# Patient Record
Sex: FEMALE | Race: ASIAN | NOT HISPANIC OR LATINO | URBAN - METROPOLITAN AREA
[De-identification: names, ages, dates, MRNs, and addresses within clinical notes are randomized per-mention and may not be internally consistent; named-entity substitution may affect disease eponyms.]

---

## 2018-07-05 ENCOUNTER — EMERGENCY (EMERGENCY)
Facility: HOSPITAL | Age: 33
LOS: 1 days | Discharge: HOME | End: 2018-07-05
Attending: EMERGENCY MEDICINE
Payer: MEDICAID

## 2018-07-05 VITALS
OXYGEN SATURATION: 100 % | SYSTOLIC BLOOD PRESSURE: 118 MMHG | RESPIRATION RATE: 16 BRPM | HEART RATE: 89 BPM | DIASTOLIC BLOOD PRESSURE: 81 MMHG | TEMPERATURE: 98 F

## 2018-07-05 VITALS
TEMPERATURE: 98 F | OXYGEN SATURATION: 100 % | HEART RATE: 85 BPM | SYSTOLIC BLOOD PRESSURE: 117 MMHG | DIASTOLIC BLOOD PRESSURE: 79 MMHG | RESPIRATION RATE: 18 BRPM

## 2018-07-05 PROCEDURE — 99053 MED SERV 10PM-8AM 24 HR FAC: CPT

## 2018-07-05 PROCEDURE — 99283 EMERGENCY DEPT VISIT LOW MDM: CPT | Mod: 25

## 2018-07-05 PROCEDURE — 99283 EMERGENCY DEPT VISIT LOW MDM: CPT

## 2018-07-05 RX ORDER — CALAMINE AND ZINC OXIDE AND PHENOL 160; 10 MG/ML; MG/ML
1 LOTION TOPICAL ONCE
Qty: 0 | Refills: 0 | Status: COMPLETED | OUTPATIENT
Start: 2018-07-05 | End: 2018-07-05

## 2018-07-05 RX ADMIN — CALAMINE AND ZINC OXIDE AND PHENOL 1 APPLICATION(S): 160; 10 LOTION TOPICAL at 01:27

## 2018-07-05 NOTE — ED PROVIDER NOTE - NS ED ROS FT
CONST: no fevers, no chills  EYES: no pain, no vision changes  ENT: no sore throat, no ear pain, no change in hearing  CV: no chest pain, no leg swelling  RESP: no shortness of breath, no cough  ABD: no abdominal pain, no nausea, no vomiting, no diarrhea  : no dysuria, no flank pain, no hematuria  MSK: no back pain, no extremity pain  NEURO: no headache or additional neurologic complaints  HEME: no easy bleeding  SKIN:  +rash on right arm

## 2018-07-05 NOTE — ED PROVIDER NOTE - PLAN OF CARE
1. TAKE ALL MEDICATIONS AS DIRECTED.    2. FOR PAIN OR FEVER YOU CAN TAKE IBUPROFEN (MOTRIN, ADVIL) OR ACETAMINOPHEN (TYLENOL) AS NEEDED, AS DIRECTED ON PACKAGING.  3. FOLLOW UP WITH YOUR PRIMARY DOCTOR WITHIN 5 DAYS AS DIRECTED.  4. IF YOU HAD LABS OR IMAGING DONE, YOU WERE GIVEN COPIES OF ALL LABS AND/OR IMAGING RESULTS FROM YOUR ER VISIT--PLEASE TAKE THEM WITH YOU TO YOUR FOLLOW UP APPOINTMENTS.  5. IF NEEDED, CALL PATIENT ACCESS SERVICES AT [phone] TO FIND A PRIMARY CARE PHYSICIAN.  OR CALL [phone] TO MAKE AN APPOINTMENT WITH THE CLINIC.  6. RETURN TO THE ER FOR ANY WORSENING SYMPTOMS OR CONCERNS.

## 2018-07-05 NOTE — ED PROVIDER NOTE - ATTENDING CONTRIBUTION TO CARE
34y/o F with no significant PMH c/o rash to the rigth forearm for 1 week, starting after exposure to poison ivy while gardening.  No fever/chills, redness has been improving but pain is persistent, had seen doctor at  and was prescribed a cream (unclear what) but her pharmacy did not have it in stock, now presents today for same symptoms.  Benadryl use at home with some improvement.    On exam, is well appearing in NAD, afebrile, MMM.  No drooling/stridor, no oral edema.  Skin:  +crusted raised lesion on dorsal aspect of forearm as well as two lesions on ventral aspect of mid-forearm; no surrounding erythema, no active bleeding/drainage.    PE and history are c/w dermatitis 2/2 poison ivy exposure, no evidence of bacterial superinfection.  Will dc with continued use of benadryl and addition of calamine lotion.  Return precautions for fever, increasing/streaking redness, purulent drainage.

## 2018-07-05 NOTE — ED PROVIDER NOTE - OBJECTIVE STATEMENT
33F no pmhx presents with one week of pruritic rash on her right arm which started 1 week ago after cleaning in her garden, went to urgent care, was told to take benadryl and was sent "a cream" to her pharmacy but she could not wait to pick it up. She has no other complaints. No rashes anywhere else.

## 2018-07-05 NOTE — ED PROVIDER NOTE - PHYSICAL EXAMINATION
Chantell Redmond, .:   GENERAL: Patient awake alert NAD.  HEENT: Moist mucous membranes, PERRL, EOMI  LUNGS: CTAB, no wheezes or crackles.   CARDIAC: RRR, no m/r/g.    ABDOMEN: Soft, NT, ND, No rebound, guarding. No CVA tenderness.   EXT: No edema. No calf tenderness.  NEURO: A&Ox3. Gait normal.    SKIN: Warm and dry. +crusted raised lesion on dorsal aspect of forearm as well as two lesions on ventral aspect of mid-forearm. No necrotic tissue present, no sign of cellulitis. no lesions anywhere else on the body.

## 2018-07-05 NOTE — ED PROVIDER NOTE - MEDICAL DECISION MAKING DETAILS
33F p/w rash. Likely poison ivy. Unlikely zoster as not dermatomal. Will send home with calamine lotions. Benadryl prn.

## 2018-07-05 NOTE — ED PROVIDER NOTE - CARE PLAN
Principal Discharge DX:	Poison ivy  Assessment and plan of treatment:	1. TAKE ALL MEDICATIONS AS DIRECTED.    2. FOR PAIN OR FEVER YOU CAN TAKE IBUPROFEN (MOTRIN, ADVIL) OR ACETAMINOPHEN (TYLENOL) AS NEEDED, AS DIRECTED ON PACKAGING.  3. FOLLOW UP WITH YOUR PRIMARY DOCTOR WITHIN 5 DAYS AS DIRECTED.  4. IF YOU HAD LABS OR IMAGING DONE, YOU WERE GIVEN COPIES OF ALL LABS AND/OR IMAGING RESULTS FROM YOUR ER VISIT--PLEASE TAKE THEM WITH YOU TO YOUR FOLLOW UP APPOINTMENTS.  5. IF NEEDED, CALL PATIENT ACCESS SERVICES AT [phone] TO FIND A PRIMARY CARE PHYSICIAN.  OR CALL [phone] TO MAKE AN APPOINTMENT WITH THE CLINIC.  6. RETURN TO THE ER FOR ANY WORSENING SYMPTOMS OR CONCERNS.

## 2018-07-05 NOTE — ED ADULT NURSE NOTE - OBJECTIVE STATEMENT
32 yo F no pmh came to ED c/o rash on right forearm starting 6 days ago after gardening.  Pt states that she went to an urgent care yesterday, was prescribed medication, but was unable to pick it up.  Pt states that she has used a cortisone cream that give her temporary relief.  Denies taking any other medications.  Denies any other symptoms, chest pain, back pain, SOB, fevers/chills, n/v/d, lightheadedness, dizziness, changes in urinary or bowel habits.  A&Ox4, skin w/d/i, rash noted on right forearm, blistered, scabbed over.  Safety and comfort maintained. Will continue to monitor.

## 2018-07-05 NOTE — ED ADULT NURSE NOTE - CHPI ED SYMPTOMS NEG
no fever/no scaly patches on skin/no petechia/no chills/no inflammation/no itching/no decreased eating/drinking/no bruising

## 2018-07-23 ENCOUNTER — EMERGENCY (EMERGENCY)
Facility: HOSPITAL | Age: 33
LOS: 1 days | Discharge: HOME | End: 2018-07-23
Attending: EMERGENCY MEDICINE
Payer: MEDICAID

## 2018-07-23 VITALS
OXYGEN SATURATION: 100 % | HEART RATE: 85 BPM | DIASTOLIC BLOOD PRESSURE: 75 MMHG | TEMPERATURE: 98 F | SYSTOLIC BLOOD PRESSURE: 128 MMHG | RESPIRATION RATE: 18 BRPM

## 2018-07-23 VITALS
RESPIRATION RATE: 20 BRPM | OXYGEN SATURATION: 100 % | TEMPERATURE: 98 F | HEIGHT: 62 IN | SYSTOLIC BLOOD PRESSURE: 147 MMHG | HEART RATE: 95 BPM | DIASTOLIC BLOOD PRESSURE: 95 MMHG | WEIGHT: 149.91 LBS

## 2018-07-23 LAB
ALBUMIN SERPL ELPH-MCNC: 4.2 G/DL — SIGNIFICANT CHANGE UP (ref 3.3–5)
ALP SERPL-CCNC: 109 U/L — SIGNIFICANT CHANGE UP (ref 40–120)
ALT FLD-CCNC: 9 U/L — LOW (ref 10–45)
ANION GAP SERPL CALC-SCNC: 12 MMOL/L — SIGNIFICANT CHANGE UP (ref 5–17)
APTT BLD: 28.8 SEC — SIGNIFICANT CHANGE UP (ref 27.5–37.4)
AST SERPL-CCNC: 16 U/L — SIGNIFICANT CHANGE UP (ref 10–40)
BASOPHILS # BLD AUTO: 0.1 K/UL — SIGNIFICANT CHANGE UP (ref 0–0.2)
BASOPHILS NFR BLD AUTO: 0.8 % — SIGNIFICANT CHANGE UP (ref 0–2)
BILIRUB SERPL-MCNC: 0.2 MG/DL — SIGNIFICANT CHANGE UP (ref 0.2–1.2)
BUN SERPL-MCNC: 11 MG/DL — SIGNIFICANT CHANGE UP (ref 7–23)
CALCIUM SERPL-MCNC: 9.6 MG/DL — SIGNIFICANT CHANGE UP (ref 8.4–10.5)
CHLORIDE SERPL-SCNC: 104 MMOL/L — SIGNIFICANT CHANGE UP (ref 96–108)
CO2 SERPL-SCNC: 24 MMOL/L — SIGNIFICANT CHANGE UP (ref 22–31)
CREAT SERPL-MCNC: 0.71 MG/DL — SIGNIFICANT CHANGE UP (ref 0.5–1.3)
EOSINOPHIL # BLD AUTO: 0.2 K/UL — SIGNIFICANT CHANGE UP (ref 0–0.5)
EOSINOPHIL NFR BLD AUTO: 2 % — SIGNIFICANT CHANGE UP (ref 0–6)
GLUCOSE SERPL-MCNC: 84 MG/DL — SIGNIFICANT CHANGE UP (ref 70–99)
HCT VFR BLD CALC: 38.1 % — SIGNIFICANT CHANGE UP (ref 34.5–45)
HGB BLD-MCNC: 13 G/DL — SIGNIFICANT CHANGE UP (ref 11.5–15.5)
INR BLD: 1.13 RATIO — SIGNIFICANT CHANGE UP (ref 0.88–1.16)
LYMPHOCYTES # BLD AUTO: 38.4 % — SIGNIFICANT CHANGE UP (ref 13–44)
LYMPHOCYTES # BLD AUTO: 4.6 K/UL — HIGH (ref 1–3.3)
MCHC RBC-ENTMCNC: 28.4 PG — SIGNIFICANT CHANGE UP (ref 27–34)
MCHC RBC-ENTMCNC: 34.2 GM/DL — SIGNIFICANT CHANGE UP (ref 32–36)
MCV RBC AUTO: 83 FL — SIGNIFICANT CHANGE UP (ref 80–100)
MONOCYTES # BLD AUTO: 0.9 K/UL — SIGNIFICANT CHANGE UP (ref 0–0.9)
MONOCYTES NFR BLD AUTO: 7.3 % — SIGNIFICANT CHANGE UP (ref 2–14)
NEUTROPHILS # BLD AUTO: 6.2 K/UL — SIGNIFICANT CHANGE UP (ref 1.8–7.4)
NEUTROPHILS NFR BLD AUTO: 51.5 % — SIGNIFICANT CHANGE UP (ref 43–77)
PLATELET # BLD AUTO: 329 K/UL — SIGNIFICANT CHANGE UP (ref 150–400)
POTASSIUM SERPL-MCNC: 3.8 MMOL/L — SIGNIFICANT CHANGE UP (ref 3.5–5.3)
POTASSIUM SERPL-SCNC: 3.8 MMOL/L — SIGNIFICANT CHANGE UP (ref 3.5–5.3)
PROT SERPL-MCNC: 8 G/DL — SIGNIFICANT CHANGE UP (ref 6–8.3)
PROTHROM AB SERPL-ACNC: 12.2 SEC — SIGNIFICANT CHANGE UP (ref 9.8–12.7)
RBC # BLD: 4.59 M/UL — SIGNIFICANT CHANGE UP (ref 3.8–5.2)
RBC # FLD: 12.7 % — SIGNIFICANT CHANGE UP (ref 10.3–14.5)
SODIUM SERPL-SCNC: 140 MMOL/L — SIGNIFICANT CHANGE UP (ref 135–145)
TROPONIN T, HIGH SENSITIVITY RESULT: <6 NG/L — SIGNIFICANT CHANGE UP (ref 0–51)
WBC # BLD: 12 K/UL — HIGH (ref 3.8–10.5)
WBC # FLD AUTO: 12 K/UL — HIGH (ref 3.8–10.5)

## 2018-07-23 PROCEDURE — 85730 THROMBOPLASTIN TIME PARTIAL: CPT

## 2018-07-23 PROCEDURE — 93010 ELECTROCARDIOGRAM REPORT: CPT

## 2018-07-23 PROCEDURE — 80053 COMPREHEN METABOLIC PANEL: CPT

## 2018-07-23 PROCEDURE — 99284 EMERGENCY DEPT VISIT MOD MDM: CPT | Mod: 25

## 2018-07-23 PROCEDURE — 85610 PROTHROMBIN TIME: CPT

## 2018-07-23 PROCEDURE — 85027 COMPLETE CBC AUTOMATED: CPT

## 2018-07-23 PROCEDURE — 84484 ASSAY OF TROPONIN QUANT: CPT

## 2018-07-23 PROCEDURE — 93005 ELECTROCARDIOGRAM TRACING: CPT

## 2018-07-23 RX ORDER — ASPIRIN/CALCIUM CARB/MAGNESIUM 324 MG
324 TABLET ORAL DAILY
Qty: 0 | Refills: 0 | Status: DISCONTINUED | OUTPATIENT
Start: 2018-07-23 | End: 2018-07-27

## 2018-07-23 RX ADMIN — Medication 324 MILLIGRAM(S): at 18:51

## 2018-07-23 NOTE — ED PROVIDER NOTE - PLAN OF CARE
Follow up with your Primary Care Physician within the next 2-3 days  Follow up with Cardiology within the next 2 days [phone]  Bring a copy of your test results with you to your appointment  Continue your current medication regimen  Return to the Emergency Room if you experience new or worsening symptoms

## 2018-07-23 NOTE — ED ADULT TRIAGE NOTE - CHIEF COMPLAINT QUOTE
Pt. states she has been having intermittent chest pain and shortness of breath for the past few month, resolves on its own. States three hours ago she began to have chest pain and shortness of breath. Pt. denies any cardiac history, pt. speaking in full sentences. Also reports dizziness.

## 2018-07-23 NOTE — ED PROVIDER NOTE - CARE PLAN
Principal Discharge DX:	Chest pain  Assessment and plan of treatment:	Follow up with your Primary Care Physician within the next 2-3 days  Follow up with Cardiology within the next 2 days [phone]  Bring a copy of your test results with you to your appointment  Continue your current medication regimen  Return to the Emergency Room if you experience new or worsening symptoms

## 2018-07-23 NOTE — ED PROVIDER NOTE - PHYSICAL EXAMINATION
+ left chest wall tenderness to palpation, + left pectorailis major pain with activation of the  muscle

## 2018-07-23 NOTE — ED PROVIDER NOTE - OBJECTIVE STATEMENT
33 F w  no PMHx presents w complaint of intermittent left sided chest tightness which radiates down the left arm to the fingers lasting 4 minutes. The pain has been present for the past 4 months. The pain returned today at 11 am but did not go away as it usually does. She also reports chest tightness and fatigue with todays episode. Denies recent travel. The pain is worse with movement of the left arm and deep inspiration.  + Fam Hx of CAD, her father had CABG at 59 1 week ago

## 2018-07-23 NOTE — ED PROVIDER NOTE - MEDICAL DECISION MAKING DETAILS
34 y/o female with left sided chest pain reproducible with palpation for the past 3 months. Father had history of CABG at 59 years old. DDx: ACS vs costochondritis. Will order cardiac workup and reassess.  ATTG: Dr. Jay

## 2018-07-23 NOTE — ED ADULT NURSE NOTE - OBJECTIVE STATEMENT
33 y.o female no significant pmh presenting to ED c/o sharp left sided chest discomfort that radiates down her left arm that began this am. pt states she has been experiencing similar s/s intermittently over the passed few months but usually the symptoms would resolve on their own and has never lasted this long or been this painful. pt states she also feels sob at times with CORONEL. cp and sob made better at rest and worse upon movement and with positional changes. pt also c/o pain upon inspiration, denies any cough, fevers, chills, weakness, numbness, tingling, or weakness. pt denies having cardiac hx but states she does have a family cardiac hx with her father. EKG completed upon arrival. labs and line obtained, remains on continuous cardiac monitor. family at the bedside. safety maintained.

## 2022-03-18 ENCOUNTER — EMERGENCY (EMERGENCY)
Facility: HOSPITAL | Age: 37
LOS: 1 days | Discharge: HOME | End: 2022-03-18
Attending: EMERGENCY MEDICINE | Admitting: STUDENT IN AN ORGANIZED HEALTH CARE EDUCATION/TRAINING PROGRAM
Payer: MEDICAID

## 2022-03-18 VITALS
TEMPERATURE: 98 F | OXYGEN SATURATION: 99 % | DIASTOLIC BLOOD PRESSURE: 82 MMHG | SYSTOLIC BLOOD PRESSURE: 126 MMHG | HEART RATE: 75 BPM | RESPIRATION RATE: 16 BRPM

## 2022-03-18 VITALS
SYSTOLIC BLOOD PRESSURE: 122 MMHG | HEIGHT: 62 IN | TEMPERATURE: 98 F | RESPIRATION RATE: 16 BRPM | HEART RATE: 98 BPM | DIASTOLIC BLOOD PRESSURE: 69 MMHG | OXYGEN SATURATION: 100 %

## 2022-03-18 LAB
ALBUMIN SERPL ELPH-MCNC: 4 G/DL — SIGNIFICANT CHANGE UP (ref 3.3–5)
ALP SERPL-CCNC: 81 U/L — SIGNIFICANT CHANGE UP (ref 40–120)
ALT FLD-CCNC: 9 U/L — SIGNIFICANT CHANGE UP (ref 4–33)
ANION GAP SERPL CALC-SCNC: 13 MMOL/L — SIGNIFICANT CHANGE UP (ref 7–14)
APPEARANCE UR: ABNORMAL
AST SERPL-CCNC: 13 U/L — SIGNIFICANT CHANGE UP (ref 4–32)
BASOPHILS # BLD AUTO: 0.07 K/UL — SIGNIFICANT CHANGE UP (ref 0–0.2)
BASOPHILS NFR BLD AUTO: 0.5 % — SIGNIFICANT CHANGE UP (ref 0–2)
BILIRUB SERPL-MCNC: <0.2 MG/DL — SIGNIFICANT CHANGE UP (ref 0.2–1.2)
BILIRUB UR-MCNC: NEGATIVE — SIGNIFICANT CHANGE UP
BLD GP AB SCN SERPL QL: NEGATIVE — SIGNIFICANT CHANGE UP
BUN SERPL-MCNC: 8 MG/DL — SIGNIFICANT CHANGE UP (ref 7–23)
CALCIUM SERPL-MCNC: 10.2 MG/DL — SIGNIFICANT CHANGE UP (ref 8.4–10.5)
CHLORIDE SERPL-SCNC: 103 MMOL/L — SIGNIFICANT CHANGE UP (ref 98–107)
CO2 SERPL-SCNC: 23 MMOL/L — SIGNIFICANT CHANGE UP (ref 22–31)
COLOR SPEC: YELLOW — SIGNIFICANT CHANGE UP
CREAT SERPL-MCNC: 0.52 MG/DL — SIGNIFICANT CHANGE UP (ref 0.5–1.3)
DIFF PNL FLD: ABNORMAL
EGFR: 122 ML/MIN/1.73M2 — SIGNIFICANT CHANGE UP
EOSINOPHIL # BLD AUTO: 0.21 K/UL — SIGNIFICANT CHANGE UP (ref 0–0.5)
EOSINOPHIL NFR BLD AUTO: 1.6 % — SIGNIFICANT CHANGE UP (ref 0–6)
GLUCOSE SERPL-MCNC: 86 MG/DL — SIGNIFICANT CHANGE UP (ref 70–99)
GLUCOSE UR QL: NEGATIVE — SIGNIFICANT CHANGE UP
HCG SERPL-ACNC: SIGNIFICANT CHANGE UP MIU/ML
HCT VFR BLD CALC: 30.8 % — LOW (ref 34.5–45)
HGB BLD-MCNC: 9.7 G/DL — LOW (ref 11.5–15.5)
IANC: 8.06 K/UL — SIGNIFICANT CHANGE UP (ref 1.5–8.5)
IMM GRANULOCYTES NFR BLD AUTO: 0.3 % — SIGNIFICANT CHANGE UP (ref 0–1.5)
KETONES UR-MCNC: NEGATIVE — SIGNIFICANT CHANGE UP
LEUKOCYTE ESTERASE UR-ACNC: NEGATIVE — SIGNIFICANT CHANGE UP
LYMPHOCYTES # BLD AUTO: 26.6 % — SIGNIFICANT CHANGE UP (ref 13–44)
LYMPHOCYTES # BLD AUTO: 3.41 K/UL — HIGH (ref 1–3.3)
MCHC RBC-ENTMCNC: 22 PG — LOW (ref 27–34)
MCHC RBC-ENTMCNC: 31.5 GM/DL — LOW (ref 32–36)
MCV RBC AUTO: 70 FL — LOW (ref 80–100)
MONOCYTES # BLD AUTO: 1.04 K/UL — HIGH (ref 0–0.9)
MONOCYTES NFR BLD AUTO: 8.1 % — SIGNIFICANT CHANGE UP (ref 2–14)
NEUTROPHILS # BLD AUTO: 8.06 K/UL — HIGH (ref 1.8–7.4)
NEUTROPHILS NFR BLD AUTO: 62.9 % — SIGNIFICANT CHANGE UP (ref 43–77)
NITRITE UR-MCNC: NEGATIVE — SIGNIFICANT CHANGE UP
NRBC # BLD: 0 /100 WBCS — SIGNIFICANT CHANGE UP
NRBC # FLD: 0 K/UL — SIGNIFICANT CHANGE UP
PH UR: 7 — SIGNIFICANT CHANGE UP (ref 5–8)
PLATELET # BLD AUTO: 491 K/UL — HIGH (ref 150–400)
POTASSIUM SERPL-MCNC: 4 MMOL/L — SIGNIFICANT CHANGE UP (ref 3.5–5.3)
POTASSIUM SERPL-SCNC: 4 MMOL/L — SIGNIFICANT CHANGE UP (ref 3.5–5.3)
PROT SERPL-MCNC: 7.5 G/DL — SIGNIFICANT CHANGE UP (ref 6–8.3)
PROT UR-MCNC: ABNORMAL
RBC # BLD: 4.4 M/UL — SIGNIFICANT CHANGE UP (ref 3.8–5.2)
RBC # FLD: 19 % — HIGH (ref 10.3–14.5)
RH IG SCN BLD-IMP: NEGATIVE — SIGNIFICANT CHANGE UP
SODIUM SERPL-SCNC: 139 MMOL/L — SIGNIFICANT CHANGE UP (ref 135–145)
SP GR SPEC: 1.02 — SIGNIFICANT CHANGE UP (ref 1–1.05)
UROBILINOGEN FLD QL: SIGNIFICANT CHANGE UP
WBC # BLD: 12.83 K/UL — HIGH (ref 3.8–10.5)
WBC # FLD AUTO: 12.83 K/UL — HIGH (ref 3.8–10.5)

## 2022-03-18 PROCEDURE — 76817 TRANSVAGINAL US OBSTETRIC: CPT | Mod: 26

## 2022-03-18 PROCEDURE — 99285 EMERGENCY DEPT VISIT HI MDM: CPT

## 2022-03-18 RX ORDER — SODIUM CHLORIDE 9 MG/ML
1000 INJECTION INTRAMUSCULAR; INTRAVENOUS; SUBCUTANEOUS ONCE
Refills: 0 | Status: COMPLETED | OUTPATIENT
Start: 2022-03-18 | End: 2022-03-18

## 2022-03-18 RX ADMIN — SODIUM CHLORIDE 2000 MILLILITER(S): 9 INJECTION INTRAMUSCULAR; INTRAVENOUS; SUBCUTANEOUS at 16:58

## 2022-03-18 NOTE — ED PROVIDER NOTE - PHYSICAL EXAMINATION
GEN:   comfortable, in no apparent distress, AOx3  EYES:   PERRL, extra-occular movements intact  RESP:   clear to auscultation bilaterally, non-labored, speaking in full sentences  ABD:   soft, non tender, no guarding  :   no cva tenderness  MSK:   no musculoskeletal tenderness, 5/5 strength, moving all extremities  SKIN:   dry, intact, no rash  NEURO:   AOx3, no focal weakness or loss of sensation, gait normal, GCS 15  PSYCH: calm, cooperative, no apparent risk to self and others

## 2022-03-18 NOTE — ED ADULT NURSE NOTE - OBJECTIVE STATEMENT
Pt awake and alert x 4 co vaginal bleeding earlier today passing a large clot. Pt now denies any cramps or pain. iv placed pt awaiting u/s.  at bedside.

## 2022-03-18 NOTE — ED PROVIDER NOTE - PROGRESS NOTE DETAILS
Patient noted to be RH negative on results.  Did not receive Rhogam during this pregnancy, will order dose. Patient received Rhogam. Patient nontoxic and medically stable for discharge. Results  discussed with patient. Return precautions provided and patient understands to return to the ED for concerning or worsening signs and symptoms. Instructed to follow up with OBGYN and agreeable. Patient's questions answered.

## 2022-03-18 NOTE — ED PROVIDER NOTE - OBJECTIVE STATEMENT
36 year old female no past history, 8 weeks pregnant  LMP 22 presents for vaginal bleeding.  States that bleeding started with one clot at 1030 this morning and since has been going through about 1 pad every 2 hours.  Patient denies any cramping, fevers, chills.  Denies any pregnancy complications prior to today.  OBGYN Dr. Cotter affiliated with [facility], [place].

## 2022-03-18 NOTE — ED PROVIDER NOTE - CLINICAL SUMMARY MEDICAL DECISION MAKING FREE TEXT BOX
36 year old female no past history, 8 weeks pregnant  LMP 22 presents for vaginal bleeding 8 weeks pregnant.  Will check labs, US, provide IVF.

## 2022-03-18 NOTE — ED PROVIDER NOTE - SHIFT CHANGE DETAILS
Dr. Landeros: Pt signed out to me by Dr. Guerrero. Pt pending US reading. Hemodynamically stable at this time.

## 2022-03-18 NOTE — ED PROVIDER NOTE - ATTENDING CONTRIBUTION TO CARE
agree with NP note    "36 year old female no past history, 8 weeks pregnant  LMP 22 presents for vaginal bleeding.  States that bleeding started with one clot at 1030 this morning and since has been going through about 1 pad every 2 hours.  Patient denies any cramping, fevers, chills.  Denies any pregnancy complications prior to today.  OBGYN Dr. Cotter affiliated with [facility]"    PE: well appearing; VSS: CTAB/L; s1 s2 no m/r/g abd soft/NT/ND ext: no edema    Imp: r/o ectopic pregnancy; labs, type, UA, TVUS and reassess

## 2022-03-18 NOTE — ED PROVIDER NOTE - PATIENT PORTAL LINK FT
You can access the FollowMyHealth Patient Portal offered by [facility] by registering at the following website: [email or web address]. By joining [facility]’s FollowMyHealth portal, you will also be able to view your health information using other applications (apps) compatible with our system.

## 2022-03-18 NOTE — ED PROVIDER NOTE - NS ED ATTENDING STATEMENT MOD
This was a shared visit with the JANEE. I reviewed and verified the documentation and independently performed the documented:

## 2022-03-19 LAB
CULTURE RESULTS: SIGNIFICANT CHANGE UP
SPECIMEN SOURCE: SIGNIFICANT CHANGE UP

## 2022-03-21 PROBLEM — Z00.00 ENCOUNTER FOR PREVENTIVE HEALTH EXAMINATION: Status: ACTIVE | Noted: 2022-03-21

## 2022-03-23 ENCOUNTER — APPOINTMENT (OUTPATIENT)
Dept: OBGYN | Facility: CLINIC | Age: 37
End: 2022-03-23
Payer: MEDICAID

## 2022-03-23 VITALS
BODY MASS INDEX: 28.91 KG/M2 | DIASTOLIC BLOOD PRESSURE: 77 MMHG | HEIGHT: 62 IN | WEIGHT: 157.13 LBS | OXYGEN SATURATION: 98 % | SYSTOLIC BLOOD PRESSURE: 122 MMHG | TEMPERATURE: 98.2 F

## 2022-03-23 DIAGNOSIS — Z32.01 ENCOUNTER FOR PREGNANCY TEST, RESULT POSITIVE: ICD-10-CM

## 2022-03-23 DIAGNOSIS — Z82.49 FAMILY HISTORY OF ISCHEMIC HEART DISEASE AND OTHER DISEASES OF THE CIRCULATORY SYSTEM: ICD-10-CM

## 2022-03-23 DIAGNOSIS — Z86.69 PERSONAL HISTORY OF OTHER DISEASES OF THE NERVOUS SYSTEM AND SENSE ORGANS: ICD-10-CM

## 2022-03-23 DIAGNOSIS — Z78.9 OTHER SPECIFIED HEALTH STATUS: ICD-10-CM

## 2022-03-23 DIAGNOSIS — Z83.3 FAMILY HISTORY OF DIABETES MELLITUS: ICD-10-CM

## 2022-03-23 PROCEDURE — 99204 OFFICE O/P NEW MOD 45 MIN: CPT | Mod: 25

## 2022-03-23 PROCEDURE — 76815 OB US LIMITED FETUS(S): CPT | Mod: 59

## 2022-03-23 RX ORDER — PRENATAL VIT NO.130/IRON/FOLIC 27MG-0.8MG
27-0.8 TABLET ORAL
Refills: 0 | Status: ACTIVE | COMMUNITY

## 2022-03-23 NOTE — HISTORY OF PRESENT ILLNESS
[Normal Amount/Duration] :  normal amount and duration [Regular Cycle Intervals] : periods have been regular [Menarche Age: ____] : age at menarche was [unfilled] [FreeTextEntry1] : 01/17/2022 [No] : Patient does not have concerns regarding sex [Currently Active] : currently active [Men] : men [Vaginal] : vaginal [Yes] : Yes

## 2022-03-23 NOTE — PHYSICAL EXAM
[Chaperone Present] : A chaperone was present in the examining room during all aspects of the physical examination [Appropriately responsive] : appropriately responsive [Alert] : alert [No Acute Distress] : no acute distress [No Lymphadenopathy] : no lymphadenopathy [Regular Rate Rhythm] : regular rate rhythm [No Murmurs] : no murmurs [Clear to Auscultation B/L] : clear to auscultation bilaterally [Soft] : soft [Non-tender] : non-tender [Non-distended] : non-distended [No HSM] : No HSM [No Lesions] : no lesions [No Mass] : no mass [Oriented x3] : oriented x3 [Examination Of The Breasts] : a normal appearance [No Masses] : no breast masses were palpable [Labia Majora] : normal [Labia Minora] : normal [Discharge] : discharge [Scant] : scant [Foul Smelling] : not foul smelling [White] : white [Thick] : thick [Bloody] : bloody [Normal] : normal [Normal Position] : in a normal position [Enlarged ___ wks] : enlarged [unfilled] ~Uweeks [Uterine Adnexae] : normal

## 2022-03-24 LAB
HBV SURFACE AG SER QL: NONREACTIVE
HIV1+2 AB SPEC QL IA.RAPID: NONREACTIVE
RUBV IGG FLD-ACNC: 12.5 INDEX
RUBV IGG SER-IMP: POSITIVE
T PALLIDUM AB SER QL IA: NEGATIVE

## 2022-03-25 LAB
BACTERIA UR CULT: NORMAL
C TRACH RRNA SPEC QL NAA+PROBE: NOT DETECTED
HPV HIGH+LOW RISK DNA PNL CVX: NOT DETECTED
LEAD BLD-MCNC: <1 UG/DL
N GONORRHOEA RRNA SPEC QL NAA+PROBE: NOT DETECTED
SOURCE TP AMPLIFICATION: NORMAL

## 2022-03-26 LAB
APPEARANCE: CLEAR
BACTERIA: NEGATIVE
BILIRUBIN URINE: NEGATIVE
BLOOD URINE: ABNORMAL
COLOR: NORMAL
GLUCOSE QUALITATIVE U: NEGATIVE
HYALINE CASTS: 1 /LPF
KETONES URINE: NEGATIVE
LEUKOCYTE ESTERASE URINE: NEGATIVE
MICROSCOPIC-UA: NORMAL
NITRITE URINE: NEGATIVE
PH URINE: 6.5
PROTEIN URINE: NEGATIVE
RED BLOOD CELLS URINE: 4 /HPF
SPECIFIC GRAVITY URINE: 1.01
SQUAMOUS EPITHELIAL CELLS: 1 /HPF
UROBILINOGEN URINE: NORMAL
WHITE BLOOD CELLS URINE: 0 /HPF

## 2022-03-28 ENCOUNTER — EMERGENCY (EMERGENCY)
Facility: HOSPITAL | Age: 37
LOS: 1 days | Discharge: HOME | End: 2022-03-28
Attending: EMERGENCY MEDICINE
Payer: MEDICAID

## 2022-03-28 VITALS
HEART RATE: 86 BPM | SYSTOLIC BLOOD PRESSURE: 116 MMHG | RESPIRATION RATE: 18 BRPM | TEMPERATURE: 99 F | OXYGEN SATURATION: 100 % | DIASTOLIC BLOOD PRESSURE: 75 MMHG

## 2022-03-28 VITALS
HEART RATE: 107 BPM | SYSTOLIC BLOOD PRESSURE: 171 MMHG | RESPIRATION RATE: 18 BRPM | TEMPERATURE: 98 F | HEIGHT: 62 IN | OXYGEN SATURATION: 99 % | WEIGHT: 151.9 LBS | DIASTOLIC BLOOD PRESSURE: 101 MMHG

## 2022-03-28 DIAGNOSIS — Z98.890 OTHER SPECIFIED POSTPROCEDURAL STATES: Chronic | ICD-10-CM

## 2022-03-28 LAB
ALBUMIN SERPL ELPH-MCNC: 3.8 G/DL — SIGNIFICANT CHANGE UP (ref 3.3–5)
ALP SERPL-CCNC: 70 U/L — SIGNIFICANT CHANGE UP (ref 40–120)
ALT FLD-CCNC: 7 U/L — LOW (ref 10–45)
ANION GAP SERPL CALC-SCNC: 12 MMOL/L — SIGNIFICANT CHANGE UP (ref 5–17)
APPEARANCE UR: CLEAR — SIGNIFICANT CHANGE UP
AST SERPL-CCNC: 8 U/L — LOW (ref 10–40)
BASOPHILS # BLD AUTO: 0.06 K/UL — SIGNIFICANT CHANGE UP (ref 0–0.2)
BASOPHILS NFR BLD AUTO: 0.5 % — SIGNIFICANT CHANGE UP (ref 0–2)
BILIRUB SERPL-MCNC: 0.2 MG/DL — SIGNIFICANT CHANGE UP (ref 0.2–1.2)
BILIRUB UR-MCNC: NEGATIVE — SIGNIFICANT CHANGE UP
BLD GP AB SCN SERPL QL: POSITIVE — SIGNIFICANT CHANGE UP
BUN SERPL-MCNC: 6 MG/DL — LOW (ref 7–23)
CALCIUM SERPL-MCNC: 9.1 MG/DL — SIGNIFICANT CHANGE UP (ref 8.4–10.5)
CHLORIDE SERPL-SCNC: 103 MMOL/L — SIGNIFICANT CHANGE UP (ref 96–108)
CO2 SERPL-SCNC: 19 MMOL/L — LOW (ref 22–31)
COLOR SPEC: COLORLESS — SIGNIFICANT CHANGE UP
CREAT SERPL-MCNC: 0.51 MG/DL — SIGNIFICANT CHANGE UP (ref 0.5–1.3)
DIFF PNL FLD: NEGATIVE — SIGNIFICANT CHANGE UP
EGFR: 122 ML/MIN/1.73M2 — SIGNIFICANT CHANGE UP
EOSINOPHIL # BLD AUTO: 0.09 K/UL — SIGNIFICANT CHANGE UP (ref 0–0.5)
EOSINOPHIL NFR BLD AUTO: 0.7 % — SIGNIFICANT CHANGE UP (ref 0–6)
GLUCOSE SERPL-MCNC: 84 MG/DL — SIGNIFICANT CHANGE UP (ref 70–99)
GLUCOSE UR QL: NEGATIVE — SIGNIFICANT CHANGE UP
HCG SERPL-ACNC: HIGH MIU/ML
HCT VFR BLD CALC: 29.3 % — LOW (ref 34.5–45)
HGB BLD-MCNC: 8.8 G/DL — LOW (ref 11.5–15.5)
IMM GRANULOCYTES NFR BLD AUTO: 0.4 % — SIGNIFICANT CHANGE UP (ref 0–1.5)
KETONES UR-MCNC: NEGATIVE — SIGNIFICANT CHANGE UP
LEUKOCYTE ESTERASE UR-ACNC: NEGATIVE — SIGNIFICANT CHANGE UP
LYMPHOCYTES # BLD AUTO: 19.5 % — SIGNIFICANT CHANGE UP (ref 13–44)
LYMPHOCYTES # BLD AUTO: 2.54 K/UL — SIGNIFICANT CHANGE UP (ref 1–3.3)
MCHC RBC-ENTMCNC: 21.5 PG — LOW (ref 27–34)
MCHC RBC-ENTMCNC: 30 GM/DL — LOW (ref 32–36)
MCV RBC AUTO: 71.5 FL — LOW (ref 80–100)
MONOCYTES # BLD AUTO: 0.75 K/UL — SIGNIFICANT CHANGE UP (ref 0–0.9)
MONOCYTES NFR BLD AUTO: 5.8 % — SIGNIFICANT CHANGE UP (ref 2–14)
NEUTROPHILS # BLD AUTO: 9.54 K/UL — HIGH (ref 1.8–7.4)
NEUTROPHILS NFR BLD AUTO: 73.1 % — SIGNIFICANT CHANGE UP (ref 43–77)
NITRITE UR-MCNC: NEGATIVE — SIGNIFICANT CHANGE UP
NRBC # BLD: 0 /100 WBCS — SIGNIFICANT CHANGE UP (ref 0–0)
PH UR: 7 — SIGNIFICANT CHANGE UP (ref 5–8)
PLATELET # BLD AUTO: 477 K/UL — HIGH (ref 150–400)
POTASSIUM SERPL-MCNC: 4 MMOL/L — SIGNIFICANT CHANGE UP (ref 3.5–5.3)
POTASSIUM SERPL-SCNC: 4 MMOL/L — SIGNIFICANT CHANGE UP (ref 3.5–5.3)
PROT SERPL-MCNC: 6.9 G/DL — SIGNIFICANT CHANGE UP (ref 6–8.3)
PROT UR-MCNC: NEGATIVE — SIGNIFICANT CHANGE UP
RBC # BLD: 4.1 M/UL — SIGNIFICANT CHANGE UP (ref 3.8–5.2)
RBC # FLD: 19 % — HIGH (ref 10.3–14.5)
RH IG SCN BLD-IMP: NEGATIVE — SIGNIFICANT CHANGE UP
SODIUM SERPL-SCNC: 134 MMOL/L — LOW (ref 135–145)
SP GR SPEC: 1 — LOW (ref 1.01–1.02)
UROBILINOGEN FLD QL: NEGATIVE — SIGNIFICANT CHANGE UP
WBC # BLD: 13.03 K/UL — HIGH (ref 3.8–10.5)
WBC # FLD AUTO: 13.03 K/UL — HIGH (ref 3.8–10.5)

## 2022-03-28 PROCEDURE — 93975 VASCULAR STUDY: CPT | Mod: 26

## 2022-03-28 PROCEDURE — 80053 COMPREHEN METABOLIC PANEL: CPT

## 2022-03-28 PROCEDURE — 87086 URINE CULTURE/COLONY COUNT: CPT

## 2022-03-28 PROCEDURE — 76801 OB US < 14 WKS SINGLE FETUS: CPT | Mod: 26

## 2022-03-28 PROCEDURE — 76817 TRANSVAGINAL US OBSTETRIC: CPT

## 2022-03-28 PROCEDURE — 86870 RBC ANTIBODY IDENTIFICATION: CPT

## 2022-03-28 PROCEDURE — 99284 EMERGENCY DEPT VISIT MOD MDM: CPT | Mod: 25

## 2022-03-28 PROCEDURE — 76801 OB US < 14 WKS SINGLE FETUS: CPT

## 2022-03-28 PROCEDURE — 85025 COMPLETE CBC W/AUTO DIFF WBC: CPT

## 2022-03-28 PROCEDURE — 99285 EMERGENCY DEPT VISIT HI MDM: CPT

## 2022-03-28 PROCEDURE — 86901 BLOOD TYPING SEROLOGIC RH(D): CPT

## 2022-03-28 PROCEDURE — 81003 URINALYSIS AUTO W/O SCOPE: CPT

## 2022-03-28 PROCEDURE — 84702 CHORIONIC GONADOTROPIN TEST: CPT

## 2022-03-28 PROCEDURE — 93975 VASCULAR STUDY: CPT

## 2022-03-28 PROCEDURE — 86900 BLOOD TYPING SEROLOGIC ABO: CPT

## 2022-03-28 PROCEDURE — 86850 RBC ANTIBODY SCREEN: CPT

## 2022-03-28 PROCEDURE — 36415 COLL VENOUS BLD VENIPUNCTURE: CPT

## 2022-03-28 PROCEDURE — 76817 TRANSVAGINAL US OBSTETRIC: CPT | Mod: 26,59

## 2022-03-28 PROCEDURE — 86077 PHYS BLOOD BANK SERV XMATCH: CPT

## 2022-03-28 NOTE — ED PROVIDER NOTE - CARE PLAN
1 Principal Discharge DX:	Urinary retention   Principal Discharge DX:	Urinary retention  Secondary Diagnosis:	Subchorionic hemorrhage

## 2022-03-28 NOTE — ED PROVIDER NOTE - PATIENT PORTAL LINK FT
You can access the FollowMyHealth Patient Portal offered by [facility] by registering at the following website: [email or web address]. By joining [employer]’s FollowMyHealth portal, you will also be able to view your health information using other applications (apps) compatible with our system.

## 2022-03-28 NOTE — ED PROVIDER NOTE - ATTENDING CONTRIBUTION TO CARE
Patient is a 35 yo F with history of sciatica, , LMP 22, here for urinary retention and vaginal bleeding. Patient reports she last urinated around 10:30 AM and has the urge to urinate but cannot urinate at this time. She also reports she had vaginal bleeding in this pregnancy but did not bleed for 1 week until today. She is complaining of lower abdominal pain and back pain. No fevers, chills, vomiting.     Of note, patient had urinary retention last week and required a hidalgo. She has a confirmed IUP by ultrasound last week.    VS noted  Gen: uncomfortable  HEENT: EOMI, mmm  Lungs: CTAB/L no C/ W /R   CVS: RRR   Abd; Soft, ttp in lower abdomen  :   Ext: no edema  Skin: no rash  Neuro AAOx3 non focal clear speech  a/p: urinary retention/ lower abdominal discomfort, vaginal bleeding - concern for threatened AB, missed AB, bleeding in pregnancy, UTI. plan for labs, TVUS, hidalgo catheter, u/a and reassess.   - Jacy BANKS Patient is a 37 yo F with history of sciatica, , LMP 22, here for urinary retention and vaginal bleeding. Patient reports she last urinated around 10:30 AM and has the urge to urinate but cannot urinate at this time. She also reports she had vaginal bleeding in this pregnancy but did not bleed for 1 week until today. She is complaining of lower abdominal pain and back pain. No fevers, chills, vomiting.     Of note, patient had urinary retention last week and required a hidalgo. She has a confirmed IUP by ultrasound last week.    VS noted  Gen: uncomfortable  HEENT: EOMI, mmm  Lungs: CTAB/L no C/ W /R   CVS: RRR   Abd; Soft, ttp in lower abdomen  : done with Dr. Ba, normal external genitalia, os open to finger tip, closed internally, blood present at cervix, no adnexal tenderness  Ext: no edema  Skin: no rash  Neuro AAOx3 non focal clear speech  a/p: urinary retention/ lower abdominal discomfort, vaginal bleeding - concern for threatened AB, missed AB, bleeding in pregnancy, UTI. plan for labs, TVUS, hidalgo catheter, u/a and reassess.   Update: Hidalgo was placed - ~ 700 cc came out. Patient feels improved.   - Jacy BANKS

## 2022-03-28 NOTE — ED PROVIDER NOTE - OBJECTIVE STATEMENT
37yo F  @10 weeks here for vaginal bleeding and not being able to urinate. Pt last urinated this AM and since has not been able to. Pt also w/ vaginal bleeding today. Has had bleeding this pregnancy, was found to have subchorionic hematoma. Denies fever, cp, sob, abdominal pain, change in urine or bowel.

## 2022-03-28 NOTE — ED PROVIDER NOTE - CLINICAL SUMMARY MEDICAL DECISION MAKING FREE TEXT BOX
@10 weeks here for retention of urine and vaginal bleeding. VSS. Exam w/ lower abdominal tenderness. Concern for urinary retention + miscarriage vs subchorionic hematoma. Will obtain labs, urine, place hidalgo cath, TVUS, hcg, reassess.

## 2022-03-28 NOTE — ED ADULT NURSE NOTE - OBJECTIVE STATEMENT
Pt is 10 wks pregnant, with c/o urinary retention since 1030 this AM.  She was seen in ED last week for the same issue and had Tariq placed at that time.  She also noted that she started to bleed vaginally today, using 1 pad/hour.  States she also had vaginal bleeding last week with the urinary retention episode.  Pt is in obvious distress, Tariq placed, which immediately drained 400 cc clear yellow urine, and subsequently Tariq drained a total of 1000cc over next half hour. Pt had immediate relief once Tariq began to drain.

## 2022-03-28 NOTE — ED PROVIDER NOTE - PROGRESS NOTE DETAILS
CALDERON Ba (PGY-2) - pt w/ subchorionic hematoma and multiple large fibroids on US, single viable IUP. fibroids and preg are likely contributing to her retention. d/w pt she will follow up w/ urology and her GYN this week.

## 2022-03-28 NOTE — ED PROVIDER NOTE - NSFOLLOWUPINSTRUCTIONS_ED_ALL_ED_FT
You were seen in the Emergency Department for urinary retention and vaginal bleeding. As discussed you will follow up with urology in 7-10 days and see your GYN this week.     1) Continue all previously prescribed medications as directed.    2) Follow up with your primary care physician - take copies of your results.    3) Return to the Emergency Department for worsening or persistent symptoms, and/or ANY NEW OR CONCERNING SYMPTOMS.

## 2022-03-28 NOTE — ED PROVIDER NOTE - PHYSICAL EXAMINATION
Jacy MD:    VS noted  Gen: uncomfortable  HEENT: EOMI, mmm  Lungs: CTAB/L no C/ W /R   CVS: RRR   Abd; Soft, ttp in lower abdomen  : done with Dr. Ba, normal external genitalia, os open to finger tip, closed internally, blood present at cervix, no adnexal tenderness  Ext: no edema  Skin: no rash  Neuro AAOx3 non focal clear speech

## 2022-03-29 LAB
CULTURE RESULTS: NO GROWTH — SIGNIFICANT CHANGE UP
CYTOLOGY CVX/VAG DOC THIN PREP: NORMAL
HGB A MFR BLD: 98.3 %
HGB A2 MFR BLD: 1.7 %
HGB FRACT BLD-IMP: NORMAL
SPECIMEN SOURCE: SIGNIFICANT CHANGE UP

## 2022-03-30 NOTE — ED POST DISCHARGE NOTE - DETAILS
3/30/22: results d/w pt, pt had been given rhogam within 2 weeks, feels well at this time. advised followup with OBGYN. pt has appt this week. return precautions given. all questions answered - Yeison Mooney PA-C

## 2022-03-31 LAB — CFTR MUT TESTED BLD/T: NEGATIVE

## 2022-04-05 ENCOUNTER — APPOINTMENT (OUTPATIENT)
Dept: OBGYN | Facility: CLINIC | Age: 37
End: 2022-04-05

## 2022-04-12 ENCOUNTER — APPOINTMENT (OUTPATIENT)
Dept: ANTEPARTUM | Facility: CLINIC | Age: 37
End: 2022-04-12

## 2022-04-18 ENCOUNTER — APPOINTMENT (OUTPATIENT)
Dept: UROLOGY | Facility: HOSPITAL | Age: 37
End: 2022-04-18

## 2022-04-21 ENCOUNTER — APPOINTMENT (OUTPATIENT)
Dept: OBGYN | Facility: CLINIC | Age: 37
End: 2022-04-21

## 2022-06-14 NOTE — ED ADULT TRIAGE NOTE - NS ED TRIAGE AVPU SCALE
Last seen: 03/09/2022  Last refilled:   Disp Refills Start End    albuterol 108 (90 Base) MCG/ACT inhaler 18 g 1 5/10/2022     Sig - Route: INHALE 2 PUFFS INTO THE LUNGS EVERY 4 HOURS AS NEEDED FOR SHORTNESS OF BREATH OR WHEEZING - Inhalation      F/u appointment: 06/15/2022  
Alert-The patient is alert, awake and responds to voice. The patient is oriented to time, place, and person. The triage nurse is able to obtain subjective information.

## 2022-06-17 ENCOUNTER — INPATIENT (INPATIENT)
Facility: HOSPITAL | Age: 37
LOS: 3 days | Discharge: HOME | End: 2022-06-21
Attending: OBSTETRICS & GYNECOLOGY | Admitting: OBSTETRICS & GYNECOLOGY
Payer: MEDICAID

## 2022-06-17 ENCOUNTER — TRANSCRIPTION ENCOUNTER (OUTPATIENT)
Age: 37
End: 2022-06-17

## 2022-06-17 DIAGNOSIS — Z3A.00 WEEKS OF GESTATION OF PREGNANCY NOT SPECIFIED: ICD-10-CM

## 2022-06-17 DIAGNOSIS — O26.899 OTHER SPECIFIED PREGNANCY RELATED CONDITIONS, UNSPECIFIED TRIMESTER: ICD-10-CM

## 2022-06-17 DIAGNOSIS — Z98.890 OTHER SPECIFIED POSTPROCEDURAL STATES: Chronic | ICD-10-CM

## 2022-06-18 VITALS — OXYGEN SATURATION: 100 % | HEART RATE: 86 BPM

## 2022-06-18 LAB
ALBUMIN SERPL ELPH-MCNC: 2.3 G/DL — LOW (ref 3.3–5)
ALBUMIN SERPL ELPH-MCNC: 2.7 G/DL — LOW (ref 3.3–5)
ALBUMIN SERPL ELPH-MCNC: 3.2 G/DL — LOW (ref 3.3–5)
ALP SERPL-CCNC: 64 U/L — SIGNIFICANT CHANGE UP (ref 40–120)
ALP SERPL-CCNC: 65 U/L — SIGNIFICANT CHANGE UP (ref 40–120)
ALP SERPL-CCNC: 94 U/L — SIGNIFICANT CHANGE UP (ref 40–120)
ALT FLD-CCNC: 10 U/L — SIGNIFICANT CHANGE UP (ref 10–45)
ALT FLD-CCNC: 6 U/L — LOW (ref 10–45)
ALT FLD-CCNC: 9 U/L — LOW (ref 10–45)
ANION GAP SERPL CALC-SCNC: 11 MMOL/L — SIGNIFICANT CHANGE UP (ref 5–17)
ANION GAP SERPL CALC-SCNC: 13 MMOL/L — SIGNIFICANT CHANGE UP (ref 5–17)
ANION GAP SERPL CALC-SCNC: 7 MMOL/L — SIGNIFICANT CHANGE UP (ref 5–17)
ANISOCYTOSIS BLD QL: SIGNIFICANT CHANGE UP
APTT BLD: 25.3 SEC — LOW (ref 27.5–35.5)
APTT BLD: 25.5 SEC — LOW (ref 27.5–35.5)
APTT BLD: 26.2 SEC — LOW (ref 27.5–35.5)
AST SERPL-CCNC: 16 U/L — SIGNIFICANT CHANGE UP (ref 10–40)
AST SERPL-CCNC: 17 U/L — SIGNIFICANT CHANGE UP (ref 10–40)
AST SERPL-CCNC: 32 U/L — SIGNIFICANT CHANGE UP (ref 10–40)
BASE EXCESS BLDV CALC-SCNC: -4 MMOL/L — LOW (ref -2–2)
BASOPHILS # BLD AUTO: 0 K/UL — SIGNIFICANT CHANGE UP (ref 0–0.2)
BASOPHILS # BLD AUTO: 0.05 K/UL — SIGNIFICANT CHANGE UP (ref 0–0.2)
BASOPHILS NFR BLD AUTO: 0 % — SIGNIFICANT CHANGE UP (ref 0–2)
BASOPHILS NFR BLD AUTO: 0.3 % — SIGNIFICANT CHANGE UP (ref 0–2)
BILIRUB DIRECT SERPL-MCNC: <0.1 MG/DL — SIGNIFICANT CHANGE UP (ref 0–0.3)
BILIRUB INDIRECT FLD-MCNC: >0.4 MG/DL — SIGNIFICANT CHANGE UP (ref 0.2–1)
BILIRUB SERPL-MCNC: 0.3 MG/DL — SIGNIFICANT CHANGE UP (ref 0.2–1.2)
BILIRUB SERPL-MCNC: 0.5 MG/DL — SIGNIFICANT CHANGE UP (ref 0.2–1.2)
BILIRUB SERPL-MCNC: <0.1 MG/DL — LOW (ref 0.2–1.2)
BLD GP AB SCN SERPL QL: POSITIVE — SIGNIFICANT CHANGE UP
BUN SERPL-MCNC: 10 MG/DL — SIGNIFICANT CHANGE UP (ref 7–23)
BUN SERPL-MCNC: 5 MG/DL — LOW (ref 7–23)
BUN SERPL-MCNC: 7 MG/DL — SIGNIFICANT CHANGE UP (ref 7–23)
CA-I SERPL-SCNC: 1.21 MMOL/L — SIGNIFICANT CHANGE UP (ref 1.15–1.33)
CALCIUM SERPL-MCNC: 10.2 MG/DL — SIGNIFICANT CHANGE UP (ref 8.4–10.5)
CALCIUM SERPL-MCNC: 8.2 MG/DL — LOW (ref 8.4–10.5)
CALCIUM SERPL-MCNC: 8.7 MG/DL — SIGNIFICANT CHANGE UP (ref 8.4–10.5)
CHLORIDE BLDV-SCNC: 102 MMOL/L — SIGNIFICANT CHANGE UP (ref 96–108)
CHLORIDE SERPL-SCNC: 102 MMOL/L — SIGNIFICANT CHANGE UP (ref 96–108)
CHLORIDE SERPL-SCNC: 103 MMOL/L — SIGNIFICANT CHANGE UP (ref 96–108)
CHLORIDE SERPL-SCNC: 106 MMOL/L — SIGNIFICANT CHANGE UP (ref 96–108)
CO2 BLDV-SCNC: 22 MMOL/L — SIGNIFICANT CHANGE UP (ref 22–26)
CO2 SERPL-SCNC: 17 MMOL/L — LOW (ref 22–31)
CO2 SERPL-SCNC: 20 MMOL/L — LOW (ref 22–31)
CO2 SERPL-SCNC: 22 MMOL/L — SIGNIFICANT CHANGE UP (ref 22–31)
CREAT SERPL-MCNC: 0.41 MG/DL — LOW (ref 0.5–1.3)
CREAT SERPL-MCNC: 0.46 MG/DL — LOW (ref 0.5–1.3)
CREAT SERPL-MCNC: 0.55 MG/DL — SIGNIFICANT CHANGE UP (ref 0.5–1.3)
EGFR: 120 ML/MIN/1.73M2 — SIGNIFICANT CHANGE UP
EGFR: 125 ML/MIN/1.73M2 — SIGNIFICANT CHANGE UP
EGFR: 128 ML/MIN/1.73M2 — SIGNIFICANT CHANGE UP
EOSINOPHIL # BLD AUTO: 0.03 K/UL — SIGNIFICANT CHANGE UP (ref 0–0.5)
EOSINOPHIL # BLD AUTO: 0.13 K/UL — SIGNIFICANT CHANGE UP (ref 0–0.5)
EOSINOPHIL NFR BLD AUTO: 0.2 % — SIGNIFICANT CHANGE UP (ref 0–6)
EOSINOPHIL NFR BLD AUTO: 0.9 % — SIGNIFICANT CHANGE UP (ref 0–6)
FIBRINOGEN PPP-MCNC: 447 MG/DL — SIGNIFICANT CHANGE UP (ref 330–520)
FIBRINOGEN PPP-MCNC: 697 MG/DL — HIGH (ref 330–520)
GAS PNL BLDA: SIGNIFICANT CHANGE UP
GAS PNL BLDV: 131 MMOL/L — LOW (ref 136–145)
GAS PNL BLDV: SIGNIFICANT CHANGE UP
GAS PNL BLDV: SIGNIFICANT CHANGE UP
GLUCOSE BLDV-MCNC: 122 MG/DL — HIGH (ref 70–99)
GLUCOSE SERPL-MCNC: 100 MG/DL — HIGH (ref 70–99)
GLUCOSE SERPL-MCNC: 125 MG/DL — HIGH (ref 70–99)
GLUCOSE SERPL-MCNC: 182 MG/DL — HIGH (ref 70–99)
HCO3 BLDV-SCNC: 21 MMOL/L — LOW (ref 22–29)
HCT VFR BLD CALC: 21.6 % — LOW (ref 34.5–45)
HCT VFR BLD CALC: 24.2 % — LOW (ref 34.5–45)
HCT VFR BLD CALC: 24.5 % — LOW (ref 34.5–45)
HCT VFR BLD CALC: 28.7 % — LOW (ref 34.5–45)
HCT VFR BLDA CALC: 26 % — LOW (ref 34.5–46.5)
HGB BLD CALC-MCNC: 8.6 G/DL — LOW (ref 11.7–16.1)
HGB BLD-MCNC: 7.3 G/DL — LOW (ref 11.5–15.5)
HGB BLD-MCNC: 8.1 G/DL — LOW (ref 11.5–15.5)
HGB BLD-MCNC: 8.3 G/DL — LOW (ref 11.5–15.5)
HGB BLD-MCNC: 8.8 G/DL — LOW (ref 11.5–15.5)
HOROWITZ INDEX BLDV+IHG-RTO: 21 — SIGNIFICANT CHANGE UP
IMM GRANULOCYTES NFR BLD AUTO: 0.8 % — SIGNIFICANT CHANGE UP (ref 0–1.5)
INR BLD: 1.14 RATIO — SIGNIFICANT CHANGE UP (ref 0.88–1.16)
INR BLD: 1.2 RATIO — HIGH (ref 0.88–1.16)
INR BLD: 1.21 RATIO — HIGH (ref 0.88–1.16)
LACTATE BLDV-MCNC: 1.5 MMOL/L — SIGNIFICANT CHANGE UP (ref 0.7–2)
LYMPHOCYTES # BLD AUTO: 1.43 K/UL — SIGNIFICANT CHANGE UP (ref 1–3.3)
LYMPHOCYTES # BLD AUTO: 15.2 % — SIGNIFICANT CHANGE UP (ref 13–44)
LYMPHOCYTES # BLD AUTO: 2.86 K/UL — SIGNIFICANT CHANGE UP (ref 1–3.3)
LYMPHOCYTES # BLD AUTO: 9.6 % — LOW (ref 13–44)
MAGNESIUM SERPL-MCNC: 1.3 MG/DL — LOW (ref 1.6–2.6)
MAGNESIUM SERPL-MCNC: 1.7 MG/DL — SIGNIFICANT CHANGE UP (ref 1.6–2.6)
MANUAL SMEAR VERIFICATION: SIGNIFICANT CHANGE UP
MCHC RBC-ENTMCNC: 22.7 PG — LOW (ref 27–34)
MCHC RBC-ENTMCNC: 27.1 PG — SIGNIFICANT CHANGE UP (ref 27–34)
MCHC RBC-ENTMCNC: 27.1 PG — SIGNIFICANT CHANGE UP (ref 27–34)
MCHC RBC-ENTMCNC: 27.6 PG — SIGNIFICANT CHANGE UP (ref 27–34)
MCHC RBC-ENTMCNC: 30.7 GM/DL — LOW (ref 32–36)
MCHC RBC-ENTMCNC: 33.1 GM/DL — SIGNIFICANT CHANGE UP (ref 32–36)
MCHC RBC-ENTMCNC: 33.8 GM/DL — SIGNIFICANT CHANGE UP (ref 32–36)
MCHC RBC-ENTMCNC: 34.3 GM/DL — SIGNIFICANT CHANGE UP (ref 32–36)
MCV RBC AUTO: 74.2 FL — LOW (ref 80–100)
MCV RBC AUTO: 80.3 FL — SIGNIFICANT CHANGE UP (ref 80–100)
MCV RBC AUTO: 80.4 FL — SIGNIFICANT CHANGE UP (ref 80–100)
MCV RBC AUTO: 81.9 FL — SIGNIFICANT CHANGE UP (ref 80–100)
MICROCYTES BLD QL: SIGNIFICANT CHANGE UP
MONOCYTES # BLD AUTO: 0.77 K/UL — SIGNIFICANT CHANGE UP (ref 0–0.9)
MONOCYTES # BLD AUTO: 1.39 K/UL — HIGH (ref 0–0.9)
MONOCYTES NFR BLD AUTO: 5.2 % — SIGNIFICANT CHANGE UP (ref 2–14)
MONOCYTES NFR BLD AUTO: 7.4 % — SIGNIFICANT CHANGE UP (ref 2–14)
NEUTROPHILS # BLD AUTO: 12.53 K/UL — HIGH (ref 1.8–7.4)
NEUTROPHILS # BLD AUTO: 14.38 K/UL — HIGH (ref 1.8–7.4)
NEUTROPHILS NFR BLD AUTO: 76.1 % — SIGNIFICANT CHANGE UP (ref 43–77)
NEUTROPHILS NFR BLD AUTO: 84.3 % — HIGH (ref 43–77)
NRBC # BLD: 0 /100 WBCS — SIGNIFICANT CHANGE UP (ref 0–0)
PCO2 BLDV: 35 MMHG — LOW (ref 39–42)
PH BLDV: 7.38 — SIGNIFICANT CHANGE UP (ref 7.32–7.43)
PHOSPHATE SERPL-MCNC: 3.9 MG/DL — SIGNIFICANT CHANGE UP (ref 2.5–4.5)
PHOSPHATE SERPL-MCNC: 4.1 MG/DL — SIGNIFICANT CHANGE UP (ref 2.5–4.5)
PLAT MORPH BLD: NORMAL — SIGNIFICANT CHANGE UP
PLATELET # BLD AUTO: 277 K/UL — SIGNIFICANT CHANGE UP (ref 150–400)
PLATELET # BLD AUTO: 309 K/UL — SIGNIFICANT CHANGE UP (ref 150–400)
PLATELET # BLD AUTO: 322 K/UL — SIGNIFICANT CHANGE UP (ref 150–400)
PLATELET # BLD AUTO: 496 K/UL — HIGH (ref 150–400)
PLATELET MAPPING ACTF MAX AMPLITUDE: 16.5 MM — SIGNIFICANT CHANGE UP (ref 2–19)
PLATELET MAPPING ADP MAXIMUM AMPLITUDE: 65.4 MM — SIGNIFICANT CHANGE UP (ref 45–69)
PLATELET MAPPING ADP PERCENT INHIBITION: 8.6 % — SIGNIFICANT CHANGE UP (ref 0–17)
PLATELET MAPPING HKH MAXIMUM AMPLITUDE: 70 MM (ref 53–68)
PO2 BLDV: 88 MMHG — HIGH (ref 25–45)
POIKILOCYTOSIS BLD QL AUTO: SLIGHT — SIGNIFICANT CHANGE UP
POTASSIUM BLDV-SCNC: 4.3 MMOL/L — SIGNIFICANT CHANGE UP (ref 3.5–5.1)
POTASSIUM SERPL-MCNC: 3.8 MMOL/L — SIGNIFICANT CHANGE UP (ref 3.5–5.3)
POTASSIUM SERPL-MCNC: 3.9 MMOL/L — SIGNIFICANT CHANGE UP (ref 3.5–5.3)
POTASSIUM SERPL-MCNC: 4.4 MMOL/L — SIGNIFICANT CHANGE UP (ref 3.5–5.3)
POTASSIUM SERPL-SCNC: 3.8 MMOL/L — SIGNIFICANT CHANGE UP (ref 3.5–5.3)
POTASSIUM SERPL-SCNC: 3.9 MMOL/L — SIGNIFICANT CHANGE UP (ref 3.5–5.3)
POTASSIUM SERPL-SCNC: 4.4 MMOL/L — SIGNIFICANT CHANGE UP (ref 3.5–5.3)
PROT SERPL-MCNC: 4.6 G/DL — LOW (ref 6–8.3)
PROT SERPL-MCNC: 5.1 G/DL — LOW (ref 6–8.3)
PROT SERPL-MCNC: 6.6 G/DL — SIGNIFICANT CHANGE UP (ref 6–8.3)
PROTHROM AB SERPL-ACNC: 13.3 SEC — SIGNIFICANT CHANGE UP (ref 10.5–13.4)
PROTHROM AB SERPL-ACNC: 13.8 SEC — HIGH (ref 10.5–13.4)
PROTHROM AB SERPL-ACNC: 14.1 SEC — HIGH (ref 10.5–13.4)
RAPIDTEG MAXIMUM AMPLITUDE: 68.1 MM — SIGNIFICANT CHANGE UP (ref 52–70)
RBC # BLD: 2.69 M/UL — LOW (ref 3.8–5.2)
RBC # BLD: 2.99 M/UL — LOW (ref 3.8–5.2)
RBC # BLD: 3.01 M/UL — LOW (ref 3.8–5.2)
RBC # BLD: 3.87 M/UL — SIGNIFICANT CHANGE UP (ref 3.8–5.2)
RBC # FLD: 20.5 % — HIGH (ref 10.3–14.5)
RBC # FLD: 20.7 % — HIGH (ref 10.3–14.5)
RBC # FLD: 20.8 % — HIGH (ref 10.3–14.5)
RBC # FLD: 21.4 % — HIGH (ref 10.3–14.5)
RBC BLD AUTO: ABNORMAL
RH IG SCN BLD-IMP: NEGATIVE — SIGNIFICANT CHANGE UP
SAO2 % BLDV: 99.2 % — HIGH (ref 67–88)
SODIUM SERPL-SCNC: 132 MMOL/L — LOW (ref 135–145)
SODIUM SERPL-SCNC: 134 MMOL/L — LOW (ref 135–145)
SODIUM SERPL-SCNC: 135 MMOL/L — SIGNIFICANT CHANGE UP (ref 135–145)
T PALLIDUM AB TITR SER: NEGATIVE — SIGNIFICANT CHANGE UP
TEG FUNCTIONAL FIBRINOGEN: 28.7 MM — SIGNIFICANT CHANGE UP (ref 15–32)
TEG LY30 (LYSIS): 0 % — SIGNIFICANT CHANGE UP (ref 0–2.6)
TEG REACTION TIME: 4.3 MIN (ref 4.6–9.1)
WBC # BLD: 14.86 K/UL — HIGH (ref 3.8–10.5)
WBC # BLD: 17.42 K/UL — HIGH (ref 3.8–10.5)
WBC # BLD: 18.86 K/UL — HIGH (ref 3.8–10.5)
WBC # BLD: 21.09 K/UL — HIGH (ref 3.8–10.5)
WBC # FLD AUTO: 14.86 K/UL — HIGH (ref 3.8–10.5)
WBC # FLD AUTO: 17.42 K/UL — HIGH (ref 3.8–10.5)
WBC # FLD AUTO: 18.86 K/UL — HIGH (ref 3.8–10.5)
WBC # FLD AUTO: 21.09 K/UL — HIGH (ref 3.8–10.5)

## 2022-06-18 PROCEDURE — 59514 CESAREAN DELIVERY ONLY: CPT | Mod: U7,GC

## 2022-06-18 PROCEDURE — 86077 PHYS BLOOD BANK SERV XMATCH: CPT

## 2022-06-18 PROCEDURE — 99221 1ST HOSP IP/OBS SF/LOW 40: CPT

## 2022-06-18 PROCEDURE — 74018 RADEX ABDOMEN 1 VIEW: CPT | Mod: 26

## 2022-06-18 PROCEDURE — 99223 1ST HOSP IP/OBS HIGH 75: CPT

## 2022-06-18 PROCEDURE — 71045 X-RAY EXAM CHEST 1 VIEW: CPT | Mod: 26

## 2022-06-18 PROCEDURE — 88305 TISSUE EXAM BY PATHOLOGIST: CPT | Mod: 26

## 2022-06-18 DEVICE — SURGICEL POWDER 3 GRAMS: Type: IMPLANTABLE DEVICE | Status: FUNCTIONAL

## 2022-06-18 RX ORDER — ACETAMINOPHEN 500 MG
1000 TABLET ORAL EVERY 8 HOURS
Refills: 0 | Status: DISCONTINUED | OUTPATIENT
Start: 2022-06-18 | End: 2022-06-18

## 2022-06-18 RX ORDER — HYDROMORPHONE HYDROCHLORIDE 2 MG/ML
30 INJECTION INTRAMUSCULAR; INTRAVENOUS; SUBCUTANEOUS
Refills: 0 | Status: DISCONTINUED | OUTPATIENT
Start: 2022-06-18 | End: 2022-06-18

## 2022-06-18 RX ORDER — SODIUM CHLORIDE 9 MG/ML
1000 INJECTION, SOLUTION INTRAVENOUS
Refills: 0 | Status: DISCONTINUED | OUTPATIENT
Start: 2022-06-18 | End: 2022-06-18

## 2022-06-18 RX ORDER — HYDROMORPHONE HYDROCHLORIDE 2 MG/ML
1 INJECTION INTRAMUSCULAR; INTRAVENOUS; SUBCUTANEOUS
Refills: 0 | Status: DISCONTINUED | OUTPATIENT
Start: 2022-06-18 | End: 2022-06-18

## 2022-06-18 RX ORDER — OXYCODONE HYDROCHLORIDE 5 MG/1
5 TABLET ORAL EVERY 4 HOURS
Refills: 0 | Status: DISCONTINUED | OUTPATIENT
Start: 2022-06-18 | End: 2022-06-18

## 2022-06-18 RX ORDER — MAGNESIUM SULFATE 500 MG/ML
2 VIAL (ML) INJECTION
Refills: 0 | Status: COMPLETED | OUTPATIENT
Start: 2022-06-18 | End: 2022-06-18

## 2022-06-18 RX ORDER — CHLORHEXIDINE GLUCONATE 213 G/1000ML
1 SOLUTION TOPICAL
Refills: 0 | Status: DISCONTINUED | OUTPATIENT
Start: 2022-06-18 | End: 2022-06-21

## 2022-06-18 RX ORDER — ACETAMINOPHEN 500 MG
1000 TABLET ORAL EVERY 6 HOURS
Refills: 0 | Status: COMPLETED | OUTPATIENT
Start: 2022-06-18 | End: 2022-06-19

## 2022-06-18 RX ORDER — HYDROMORPHONE HYDROCHLORIDE 2 MG/ML
0.5 INJECTION INTRAMUSCULAR; INTRAVENOUS; SUBCUTANEOUS ONCE
Refills: 0 | Status: DISCONTINUED | OUTPATIENT
Start: 2022-06-18 | End: 2022-06-18

## 2022-06-18 RX ORDER — NALOXONE HYDROCHLORIDE 4 MG/.1ML
0.1 SPRAY NASAL
Refills: 0 | Status: DISCONTINUED | OUTPATIENT
Start: 2022-06-18 | End: 2022-06-21

## 2022-06-18 RX ORDER — HYDROMORPHONE HYDROCHLORIDE 2 MG/ML
0.5 INJECTION INTRAMUSCULAR; INTRAVENOUS; SUBCUTANEOUS
Refills: 0 | Status: DISCONTINUED | OUTPATIENT
Start: 2022-06-18 | End: 2022-06-19

## 2022-06-18 RX ORDER — HYDROMORPHONE HYDROCHLORIDE 2 MG/ML
30 INJECTION INTRAMUSCULAR; INTRAVENOUS; SUBCUTANEOUS
Refills: 0 | Status: DISCONTINUED | OUTPATIENT
Start: 2022-06-18 | End: 2022-06-19

## 2022-06-18 RX ORDER — HYDROMORPHONE HYDROCHLORIDE 2 MG/ML
0.5 INJECTION INTRAMUSCULAR; INTRAVENOUS; SUBCUTANEOUS EVERY 4 HOURS
Refills: 0 | Status: DISCONTINUED | OUTPATIENT
Start: 2022-06-18 | End: 2022-06-18

## 2022-06-18 RX ORDER — FAMOTIDINE 10 MG/ML
20 INJECTION INTRAVENOUS DAILY
Refills: 0 | Status: DISCONTINUED | OUTPATIENT
Start: 2022-06-18 | End: 2022-06-19

## 2022-06-18 RX ORDER — ACETAMINOPHEN 500 MG
1000 TABLET ORAL ONCE
Refills: 0 | Status: DISCONTINUED | OUTPATIENT
Start: 2022-06-18 | End: 2022-06-18

## 2022-06-18 RX ORDER — CEFAZOLIN SODIUM 1 G
1000 VIAL (EA) INJECTION EVERY 8 HOURS
Refills: 0 | Status: DISCONTINUED | OUTPATIENT
Start: 2022-06-18 | End: 2022-06-18

## 2022-06-18 RX ORDER — ONDANSETRON 8 MG/1
4 TABLET, FILM COATED ORAL EVERY 6 HOURS
Refills: 0 | Status: DISCONTINUED | OUTPATIENT
Start: 2022-06-18 | End: 2022-06-21

## 2022-06-18 RX ORDER — KETOROLAC TROMETHAMINE 30 MG/ML
15 SYRINGE (ML) INJECTION EVERY 6 HOURS
Refills: 0 | Status: DISCONTINUED | OUTPATIENT
Start: 2022-06-18 | End: 2022-06-19

## 2022-06-18 RX ORDER — SODIUM CHLORIDE 9 MG/ML
1000 INJECTION, SOLUTION INTRAVENOUS
Refills: 0 | Status: DISCONTINUED | OUTPATIENT
Start: 2022-06-18 | End: 2022-06-19

## 2022-06-18 RX ORDER — POTASSIUM CHLORIDE 20 MEQ
10 PACKET (EA) ORAL ONCE
Refills: 0 | Status: COMPLETED | OUTPATIENT
Start: 2022-06-18 | End: 2022-06-18

## 2022-06-18 RX ORDER — CEFAZOLIN SODIUM 1 G
2000 VIAL (EA) INJECTION ONCE
Refills: 0 | Status: DISCONTINUED | OUTPATIENT
Start: 2022-06-18 | End: 2022-06-18

## 2022-06-18 RX ADMIN — SODIUM CHLORIDE 75 MILLILITER(S): 9 INJECTION, SOLUTION INTRAVENOUS at 10:18

## 2022-06-18 RX ADMIN — OXYCODONE HYDROCHLORIDE 5 MILLIGRAM(S): 5 TABLET ORAL at 07:05

## 2022-06-18 RX ADMIN — HYDROMORPHONE HYDROCHLORIDE 0.5 MILLIGRAM(S): 2 INJECTION INTRAMUSCULAR; INTRAVENOUS; SUBCUTANEOUS at 12:55

## 2022-06-18 RX ADMIN — Medication 400 MILLIGRAM(S): at 05:28

## 2022-06-18 RX ADMIN — SODIUM CHLORIDE 125 MILLILITER(S): 9 INJECTION, SOLUTION INTRAVENOUS at 07:39

## 2022-06-18 RX ADMIN — SODIUM CHLORIDE 20 MILLILITER(S): 9 INJECTION, SOLUTION INTRAVENOUS at 10:59

## 2022-06-18 RX ADMIN — HYDROMORPHONE HYDROCHLORIDE 0.5 MILLIGRAM(S): 2 INJECTION INTRAMUSCULAR; INTRAVENOUS; SUBCUTANEOUS at 07:50

## 2022-06-18 RX ADMIN — SODIUM CHLORIDE 125 MILLILITER(S): 9 INJECTION, SOLUTION INTRAVENOUS at 05:28

## 2022-06-18 RX ADMIN — Medication 100 MILLIGRAM(S): at 05:40

## 2022-06-18 RX ADMIN — HYDROMORPHONE HYDROCHLORIDE 0.5 MILLIGRAM(S): 2 INJECTION INTRAMUSCULAR; INTRAVENOUS; SUBCUTANEOUS at 05:00

## 2022-06-18 RX ADMIN — HYDROMORPHONE HYDROCHLORIDE 0.5 MILLIGRAM(S): 2 INJECTION INTRAMUSCULAR; INTRAVENOUS; SUBCUTANEOUS at 05:28

## 2022-06-18 RX ADMIN — OXYCODONE HYDROCHLORIDE 5 MILLIGRAM(S): 5 TABLET ORAL at 06:19

## 2022-06-18 RX ADMIN — HYDROMORPHONE HYDROCHLORIDE 1 MILLIGRAM(S): 2 INJECTION INTRAMUSCULAR; INTRAVENOUS; SUBCUTANEOUS at 10:00

## 2022-06-18 RX ADMIN — HYDROMORPHONE HYDROCHLORIDE 0.5 MILLIGRAM(S): 2 INJECTION INTRAMUSCULAR; INTRAVENOUS; SUBCUTANEOUS at 05:05

## 2022-06-18 RX ADMIN — Medication 100 MILLIEQUIVALENT(S): at 09:02

## 2022-06-18 RX ADMIN — HYDROMORPHONE HYDROCHLORIDE 1 MILLIGRAM(S): 2 INJECTION INTRAMUSCULAR; INTRAVENOUS; SUBCUTANEOUS at 10:15

## 2022-06-18 RX ADMIN — Medication 50 GRAM(S): at 10:18

## 2022-06-18 RX ADMIN — HYDROMORPHONE HYDROCHLORIDE 0.5 MILLIGRAM(S): 2 INJECTION INTRAMUSCULAR; INTRAVENOUS; SUBCUTANEOUS at 12:40

## 2022-06-18 RX ADMIN — HYDROMORPHONE HYDROCHLORIDE 0.5 MILLIGRAM(S): 2 INJECTION INTRAMUSCULAR; INTRAVENOUS; SUBCUTANEOUS at 07:35

## 2022-06-18 RX ADMIN — CHLORHEXIDINE GLUCONATE 1 APPLICATION(S): 213 SOLUTION TOPICAL at 05:29

## 2022-06-18 RX ADMIN — HYDROMORPHONE HYDROCHLORIDE 1 MILLIGRAM(S): 2 INJECTION INTRAMUSCULAR; INTRAVENOUS; SUBCUTANEOUS at 06:19

## 2022-06-18 RX ADMIN — Medication 400 MILLIGRAM(S): at 12:05

## 2022-06-18 RX ADMIN — FAMOTIDINE 20 MILLIGRAM(S): 10 INJECTION INTRAVENOUS at 12:25

## 2022-06-18 RX ADMIN — HYDROMORPHONE HYDROCHLORIDE 30 MILLILITER(S): 2 INJECTION INTRAMUSCULAR; INTRAVENOUS; SUBCUTANEOUS at 14:00

## 2022-06-18 RX ADMIN — Medication 50 GRAM(S): at 09:01

## 2022-06-18 RX ADMIN — Medication 400 MILLIGRAM(S): at 19:08

## 2022-06-18 RX ADMIN — Medication 1000 MILLIGRAM(S): at 12:20

## 2022-06-18 RX ADMIN — HYDROMORPHONE HYDROCHLORIDE 1 MILLIGRAM(S): 2 INJECTION INTRAMUSCULAR; INTRAVENOUS; SUBCUTANEOUS at 05:32

## 2022-06-18 RX ADMIN — Medication 1000 MILLIGRAM(S): at 06:19

## 2022-06-18 NOTE — CONSULT NOTE ADULT - SUBJECTIVE AND OBJECTIVE BOX
HISTORY OF PRESENT ILLNESS:  38yo  at 21.5wks EGA with complete previa BIBA after she started having heavy vaginal bleeding at home. Patient started having small amount of bleeding yesterday, was recommended to be admitted to [facility] by her private OBGYN however patient declined. Today bleeding increased at 10p. She denies abdominal pain, dizziness, lightheadedness, chest pain, SOB. Upon presentation, patient noted to have heavy bright red vaginal bleeding. OB team called to bedside to evaluate patient, at which time chux was noted to be filled and patient actively bleeding (QBL 1092cc). Bedside sono performed: +FHR vtx presentation, CL 3.5cm and closed, complete previa.     GYNHx: Fibroids, denies cysts, STIs, abnormal paps  OBHx: TOPx2, SAB x1 @ 8 wks (D&Cx2)  PMH: Sjogrens (SSA+), Anemia, Sciatica  PSH: D&Cx2  Meds: Iron, Vit C, ASA, Famotidine, PNV  All: Gianvi (OCP - rash)  SocHx: Denies    The patient went to the OR for emergent  (classic vertical incision and evacuation of uterus Patient received 2prbc preop with EBL of 1L preop, 2pbrc, 2ffp, 1 plt intraop with 2L EBL intraop. 2.5L crystalloid, 500cc urine output for the case. The patient was brought to the SICU for hemodynamic monitoring.    CODE STATUS:   Full Code    HOME MEDICATIONS:  None    ALLERGIES: Gianvi (Rash)      VITAL SIGNS:  ICU Vital Signs Last 24 Hrs  T(C): 36.9 (2022 04:50), Max: 36.9 (2022 04:50)  T(F): 98.4 (2022 04:50), Max: 98.4 (2022 04:50)  HR: 80 (2022 05:00) (62 - 118)  BP: 121/67 (2022 05:00) (85/48 - 140/80)  BP(mean): 85 (2022 05:00) (85 - 85)  ABP: 141/59 (2022 05:00) (141/59 - 141/59)  ABP(mean): 88 (2022 05:00) (88 - 88)  RR: 20 (2022 05:00) (18 - 20)  SpO2: 100% (2022 05:00) (98% - 100%)      NEURO  Exam: awake, alert, pain controlled with pushes of dilaudid and tylenol standing  Meds:acetaminophen   IVPB .. 1000 milliGRAM(s) IV Intermittent once  HYDROmorphone  Injectable 0.5 milliGRAM(s) IV Push once  HYDROmorphone  Injectable 0.5 milliGRAM(s) IV Push once      RESPIRATORY  ABG - ( 2022 03:41 )  pH: 7.30  /  pCO2: 37    /  pO2: 212   / HCO3: 18    / Base Excess: -7.6  /  SaO2: 98.5    Lactate: x        Exam: Nasal cannula    CARDIOVASCULAR    Exam: normal rate    GI/NUTRITION  Exam: soft, tender throughout, clean and dry incision with dermabond  Diet: NPO    GENITOURINARY/RENAL  Meds:     @ 07:01  -   @ 05:14  --------------------------------------------------------  IN:    IV PiggyBack: 2000 mL    PRBCs (Packed Red Blood Cells): 300 mL  Total IN: 2300 mL    OUT:    Blood Loss (mL): 1079 mL  Total OUT: 1079 mL    Total NET: 1221 mL        Weight (kg): 68.5 ( @ 02:03), 68.5 ( @ 23:37)      135  |  102  |  10  ----------------------------<  100<H>  3.8   |  20<L>  |  0.55    Ca    10.2      2022 00:51    TPro  6.6  /  Alb  3.2<L>  /  TBili  <0.1<L>  /  DBili  x   /  AST  17  /  ALT  10  /  AlkPhos  94      [x] Tariq catheter, indication: perioperative    HEMATOLOGIC  [ ] VTE Prophylaxis: hold in setting of acute hemorrhage                          8.8    14.86 )-----------( 496      ( 2022 00:51 )             28.7     PT/INR - ( 2022 00:51 )   PT: 13.3 sec;   INR: 1.14 ratio         PTT - ( 2022 00:51 )  PTT:26.2 sec  Transfusion: [ ] PRBC	[ ] Platelets	[ ] FFP	[ ] Cryoprecipitate      INFECTIOUS DISEASES  Meds:  RECENT CULTURES:      ENDOCRINE  Meds:  CAPILLARY BLOOD GLUCOSE          PATIENT CARE ACCESS DEVICES:  [x] Peripheral IV  [ ] Central Venous Line	[ ] R	[ ] L	[ ] IJ	[ ] Fem	[ ] SC	Placed:   [x] Arterial Line		[ ] R	[x] L	[ ] Fem	[x] Rad	[ ] Ax	Placed:   [ ] PICC:					[ ] Mediport  [ ] Urinary Catheter, Date Placed:   [x] Necessity of urinary, arterial, and venous catheters discussed    OTHER MEDICATIONS:     IMAGING STUDIES:

## 2022-06-18 NOTE — PERINATAL/NEONATAL BEREAVEMENT NOTE - COMMENTS
Patient transferred to 3KN from SICU at 1800. Patient requesting baby to come to the room when family arrives. Fetus brought to bedside at 1815. Pt. and family visibly grieving.

## 2022-06-18 NOTE — OB PROVIDER H&P - HISTORY OF PRESENT ILLNESS
38yo  at 21.5wks EGA with complete previa BIBA after she started having heavy vaginal bleeding at home. Patient started having small amount of bleeding yesterday, was recommended to be admitted to [facility] by her private OBGYN however patient declined. Today bleeding increased at 10p. She denies abdominal pain, dizziness, lightheadedness, chest pain, SOB. Upon presentation, patient noted to have heavy bright red vaginal bleeding. OB team called to bedside to evaluate patient, at which time chux was noted to be filled and patient actively bleeding (QBL 1092cc). Bedside sono performed: +FHR vtx presentation, CL 3.5cm and closed, complete previa.     GYNHx: Fibroids, denies cysts, STIs, abnormal paps  OBHx: TOPx2, SAB x1 @ 8 wks (D&Cx2)  PMH: Sjogrens (SSA+), Anemia, Sciatica  PSH: D&Cx2  Meds: Iron, Vit C, ASA, Famotidine, PNV  All: Gianvi (OCP - rash)  SocHx: Denies

## 2022-06-18 NOTE — PROGRESS NOTE ADULT - ASSESSMENT
A/P: 36yo  is now POD#0 s/p classical  in setting of 21w5d hemorrhaging placenta previa w/ hemodynamic instability EBL estimate pre and intra op 3.5-4L s/p 4L LR 4u pRBC 2u FFP 1u plts.  VSS off pressors. Exam approp. Trending CBC. Overall doing well. Can adv to reg diet, standing acetaminophen/ibuprofen dPCA. Will continue to adv as tolerated apperciate excellent care by SICU anticipate xfer to floor later today. Apperciate excellent care by SICU team    Patient seen with Dr. Landry (M attending)    Jamie Lott M.D. PGY-6  Maternal Fetal Medicine Fellow  Cell: [phone] if after 5pm or weekend ask labor and delivery for on call fellow

## 2022-06-18 NOTE — OB PROVIDER DELIVERY SUMMARY - NSSELHIDDEN_OBGYN_ALL_OB_FT
[[email or web address]_OBGYN_ALL_OB_FT:[id number]=],[NS_DeliveryAssist1_OBGYN_ALL_OB_FT:[id number]=],[NS_DeliveryAttending2_OBGYN_ALL_OB_FT:[id number]=]

## 2022-06-18 NOTE — OB PROVIDER H&P - NSHPLABSRESULTS_GEN_ALL_CORE
8.8    14.86 )-----------( 496      ( 06-18 @ 00:51 )             28.7     06-18 @ 00:51    135  |  102  |  10  ----------------------------<  100  3.8   |  20  |  0.55    Ca    10.2      06-18 @ 00:51    TPro  6.6  /  Alb  3.2  /  TBili  <0.1  /  DBili  x   /  AST  17  /  ALT  10  /  AlkPhos  94  06-18 @ 00:51    PT/INR - ( 18 Jun 2022 00:51 )   PT: 13.3 sec;   INR: 1.14 ratio         PTT - ( 18 Jun 2022 00:51 )  PTT:26.2 sec

## 2022-06-18 NOTE — CONSULT NOTE ADULT - ATTENDING COMMENTS
Patient seen and examined and agree with above.   37 year old female [id number] 21.5 weeks EGA with complete previa had vaginal bleeding who presented with bleeding and rushed to the OR for emergent c section after MTP called. She received 2PRBC, 2FFP , 1 plt intraop and brought to the sicu for hemodynamic monitoring.  I have reviewed PMH, PSH, labs, meds and imaging.     Current management includes:  Neuro- pain currently controlled with current regimen with tylenol, dilaudid and oxycodone.   CV- Hemodynamically stable with sinus tachycardia which will be monitored closely.   Lactate continues to improve.   Pulm - stable oxygenating well    -goal SpO2 92%  GI-NPO  ID- cefazolin as per OB team.   Heme- hct 28--> 24  -will monitor with serial cbcs  Endocrine - hyperglycemia and will monitor closely with goal < 180.     Will continue to monitor patient for hemorrhage closely.

## 2022-06-18 NOTE — CHART NOTE - NSCHARTNOTEFT_GEN_A_CORE
MFM Fellow Event Note    I was notified at aprox 1:15AM about this 21w5d (well dated) pt unreg to this hospital w/ known placenta previa presenting after a large bleed at work. She recently was admitted to outside hospital with first bleed and left - unclear if was discharged or AMA. Upon presenting here was actively bleeding w/ EBL 1L here w/ unknown however large amount of blood loss at home. I informed the team to stabilize the mom and to prepare for delivery which would likely require hysterotomy given cervix unfavorable, heavy active bleeding and no provider immediate available for D&E. Sonogram here confirmed placental previa +FH. I Discussed this w/ Dr. Reid Chavez who was in agreement. Inpatient team agreed and was preparing to take patient to OR as she was still briskly bleeding however hemodynamically stable. I was then informed at aprox 1:35AM that the bleeding had slowed/stopped and the patient was now refusing delivery - at this point EBL ~1L before arrival to hospital and additional 1-1.5L since arrival. At this point I made my way in to  the patient that although the bleeding has stopped given the extent of bleeding and current GA rec would be to proceed with delivery likely again hysterotomy unless cervix could be prepared and correct staff arrive. Upon arrival blood was being hung and main OR prepared for potential case. As I was discussing our concerns with the patient and her partner she ruptured membranes, began to briskly bleed and was becoming hemodynamically unstable - I stressed that she was at exceptional risk of death if we did not proceed with delivery which may also include hysterectomy. Pt and partner verbally consented and was previously consented by paper by Dr. Breen. MTP was called. A-line placed pt taken to main OR where she underwent GETA and had a classical  with removal of fetus, placenta and two 1cm fibroids. Total EBL for case 2.5L. Total products in pending please see OR dictation/delivery note for full IN/OUTs products. Infant was born limp apneic appearing passed to awaiting nurses who witnessed weak respiratory effort and HR <30 - comfort care measures were taken NICU in agreement no role for resuscitation at this gestational age. Once hysterotomy was closed I unscrubbed and went to provide full disclosure to the partner and escort him to the SICU waiting room. Emotional support provided. Will continue to monitor patient closely in SICU.     Jamie Lott MD PGY-6  MFM Fellow

## 2022-06-18 NOTE — OB PROVIDER H&P - ATTENDING COMMENTS
Pt seen by myself and Dr Breen (safety officers). Agree with assessment and plan. Pt lost at least 2L of blood while on LDR and was counseled re: life saving measures of a classic section. Risks of not proceeding were discussing including and up to loss of her life. Risk of section were disucssed as well including but not limited to Pt seen by myself and Dr Holguin (safety officers). Agree with resident history, assessment and plan. Pt lost at least 2L of blood while on LDR and was counseled re: life saving measure of a classical  as recommended by [facility]. D&E by family planning was also considered however a consult was called without response.  Risks of not proceeding with life saving measures were discussed with patient ad nauseam including and up to loss of her own life. Risks of classical section were discussed as well including but not limited to bleeding (she is accepting of blood products), DIC (A-line being placed by anesthesia to check coags more frequently), infection (Ancef 2gm IV ordered), injury to surrounding organs (bowel, bladder, uterus, tubes, ovaries, vagina), risk of injury to blood vessels and nerves that may result in permanent loss of function, increased risk of previa and accreta in future pregnancies, possible c-hyst for life saving measures and death.  Both she and her partner were made aware that her fetus is not viable at 21'5 and thus resuscitative measures would not be taken.  They verbalized understanding.  She was also counseled that she would receive general anesthesia (anesthesia also met with the patient and consents were reviewed and signed).  OB Consents were reviewed and signed and witnessed.  All questions were solicited and answered.     After the above counseling, the patient and her partner initially desired to "wait and see" if the bleeding recurred so that this highly desired pregnancy could be saved. However, while being counseled regarding proceeding with the section while she is stable for safety reasons, she had another large bleed while being counseled by [facility] and  finally agreed to an emergent classical hysterectomy.   A massive transfusion protocol was called at 0241 and the patient was taken to the main OR for emergent surgery.      MARGARITA Plaza MD

## 2022-06-18 NOTE — PROGRESS NOTE ADULT - ATTENDING COMMENTS
MFM attendinyo  is now POD#0 s/p classical /myomectomy in setting of 21w5d hemorrhaging placenta previa w/ hemodynamic instability   S/p crystalloids and transfusion of 4u pRBC 2u FFP 1u plts.    Vitals stable, Adequate urine output, abdomen soft, tender to palpation, incision intact, no vaginal bleeding.   Will address pain management with ICU team, continue to monitor vitals and UO, trend labs (CBC and coagulation times), advance diet, routine postpartum care    Dr Reid Chavez.    I was present and participate din all key portions of assessment and plan. Agree with fellow's note.

## 2022-06-18 NOTE — OB PROVIDER H&P - NSOBPROC_OBGYN_ALL_OB
fat diet and also exercise, wt loss as appropriate. Will continue periodic monitoring of fasting lipid profile, glucose, liver function. WT GAIN NOTED, PERHAPS FR THYROID BUT ALSO NOW LESS ACTIVE.    -     Lipid Panel; Future  -     Comprehensive Metabolic Panel; Future  -     CBC Auto Differential; Future    Anxiety and depression - FOR EVAL DR SHEEHAN AND CONT RX BELOW  -     venlafaxine (EFFEXOR XR) 150 MG extended release capsule; Take 1 capsule by mouth daily  -     busPIRone (BUSPAR) 10 MG tablet;  Take 1 tablet by mouth 2 times daily as needed (anxiety)    Cervical herniated disc- ON DISABILITY THR [employer] AT THIS TIME
Unknown at This Time

## 2022-06-18 NOTE — CONSULT NOTE ADULT - ASSESSMENT
38yo  at 21.5wks presenting with bleeding from placenta previa, went emergently to OR for  and evacuation of uterus. EBL 3L while in hospital, received 4prbc, 2ffp, 1 platelet. SICU consulted for hemodynamic monitoring.        PLAN:    Neuro: acute post-op pain  - Multimodal pain control with acetaminophen and Dilaudid prn    Resp:   - Out of bed to chair, ambulate as tolerated, and incentive spirometry to prevent atelectasis  - Mechanical ventilation    CV:  - Monitor vital signs    GI:   - NPO    Renal:  - Tariq  - 1 LR IVF at 125  - Monitor I&Os and electrolytes w/ repletions as necessary    Heme: 3 EBL (1L pre op and 2L you op)  - Monitor CBC and coags  - Lovenox for VTE prophylaxis on hold pending trend of H/H    ID:   - periop ancef  - Monitor WBC, temperature, and procalcitonin    Endo:   - Monitor glucose    Code Status:  Full code    Disposition:  SICU    Pardeep Clement  PGY-2  SICU  Spectra [phone]

## 2022-06-18 NOTE — OB PROVIDER H&P - NSHPPHYSICALEXAM_GEN_ALL_CORE
Vital Signs Last 24 Hrs  T(C): 36.8 (18 Jun 2022 01:45), Max: 36.8 (18 Jun 2022 01:45)  T(F): 98.2 (18 Jun 2022 01:45), Max: 98.2 (18 Jun 2022 01:45)  HR: 83 (18 Jun 2022 02:11) (74 - 118)  BP: 116/59 (18 Jun 2022 02:05) (114/62 - 140/80)  BP(mean): --  RR: 18 (18 Jun 2022 00:12) (18 - 18)  SpO2: 100% (18 Jun 2022 02:11) (98% - 100%)    Gen: appears pale, diaphoretic, tearful  Chest: breathing comfortably  Abd: soft, gravid, NT  TAUS: Vtx, +FHR, grossly normal fetal movements  TVUS: complete previa, CL 3.5cm  SSE: initially old blood clot seen in the vagina, patient started to have heavy bright red vaginal bleeding during exam, QBL 1092cc  [place]: initially some cx on monitor, spontaneously subsided

## 2022-06-18 NOTE — OB RN PATIENT PROFILE - FALL HARM RISK - UNIVERSAL INTERVENTIONS
Bed in lowest position, wheels locked, appropriate side rails in place/Call bell, personal items and telephone in reach/Instruct patient to call for assistance before getting out of bed or chair/Non-slip footwear when patient is out of bed/[place] to call system/Physically safe environment - no spills, clutter or unnecessary equipment/Purposeful Proactive Rounding/Room/bathroom lighting operational, light cord in reach

## 2022-06-18 NOTE — OB RN TRIAGE NOTE - FALL HARM RISK - UNIVERSAL INTERVENTIONS
Bed in lowest position, wheels locked, appropriate side rails in place/Call bell, personal items and telephone in reach/Instruct patient to call for assistance before getting out of bed or chair/Non-slip footwear when patient is out of bed/[place] to call system/Physically safe environment - no spills, clutter or unnecessary equipment/Purposeful Proactive Rounding/Room/bathroom lighting operational, light cord in reach

## 2022-06-18 NOTE — OB PROVIDER H&P - ASSESSMENT
36yo  at 21.5wk w/ hx of fibroids and complete previa who presented with heavy bright red vaginal bleeding. Patient currently hemodynamically stable with bleeding now stabilized. Patient counseled on need for possible classical  section for evacuation of pregnancy, however patient refusing at this time given bleeding stabilized. Patient amenable to blood transfusion at this time, actively being transfused 2u pRBC.     Plan:  #Complete Previa  -2u pRBC actively being transfused  -monitor CBC/Coags q2h  -A line to be placed by anesthesia  -NPO/IVF  -Blood, PLT, FFP on hold for OR   -MFM, NICU, Anesthesia aware  -Patient counseled and consented for  section with classical uterine incision for evacuation of pregnancy in the event of another hemorrhage; risks, benefits, alternatives discussed with patient. Patient has no objection to blood transfusions or other blood products. Patient to main OR in the event of surgical intervention. Anesthesia to inform main OR.     Pt seen, evaluated and counseled by Dr. Breen, Dr. Plaza, [facility] PGY3  D/w Dr. Lott, [facility] fellow  RFrankel PGY4

## 2022-06-18 NOTE — OB PROVIDER DELIVERY SUMMARY - NSPROVIDERDELIVERYNOTE_OBGYN_ALL_OB_FT
After transfer from triage to R, patient noted to have brisk bright red vaginal bleeding with grossly visualized PPROM. Total EBL on L&D from time of presentation approximately 2L. As A-line being placed, patient noted to be acutely hypotensive with vasovagal episode. Decision made to proceed with emergency classical  section for uterine evacuation 2/2 complete placenta previa and life threatening hemorrhage. Initially patient declining intervention in effort to prolong pregnancy, however eventually patient and partner consented to surgery. MTP called and patient moved emergently to main OR (see prior notes for more details on preop course).     Patient underwent primary classical  section.   Female fetus delivered from the vertex presentation, appeared limp, apneic with HR in 30s upon delivery, handed to awaiting L&D nurses for comfort care given pre-viability.   Hysterotomy closed in multiple layers using vicryl. Reinforced in multiple locations due to close proximity of fibroids to hysterotomy. Surgicel powder placed over hysterotomy.   Uterus with multiple fibroids: 5cm L fundal pedunculated, 3cm R fundal subserosal, 3cm ELIER subserosal, 2 3cm posterior subserosal ELIER fibroids, 2 1cm intramural fibroids at hysterotomy requiring myomectomy for hysterotomy closure. Multiple other smaller intramural fibroids.   Grossly normal fallopian tubes and ovaries.     Total EBL: 2L on L&D, 2L intraop   IVF: 2.5L crystalloid  4u pRBCs + 2u FFP + 1u PLT  UOP: 515cc After transfer from triage to LDR, patient noted to have brisk bright red vaginal bleeding with grossly visualized PPROM. Total EBL on L&D from time of presentation approximately 2L. As A-line being placed, patient noted to be acutely hypotensive with vasovagal episode. Decision made to proceed with emergency classical  section for uterine evacuation 2/2 complete placenta previa and life threatening hemorrhage. Initially patient declining intervention in effort to prolong pregnancy, however eventually patient and partner consented to surgery. MTP called and patient moved emergently to main OR (see prior notes for more details on preop course).     Patient underwent primary classical  section.   Female fetus delivered from the vertex presentation, appeared limp, apneic with HR in 30s upon delivery, handed to awaiting L&D nurses for comfort care given pre-viability.   Hysterotomy closed in multiple layers using vicryl. Reinforced in multiple locations due to close proximity of fibroids to hysterotomy. Surgicel powder placed over hysterotomy.   Uterus with multiple fibroids: 5cm L fundal pedunculated, 3cm R fundal subserosal, 3cm ELIER subserosal, 2 3cm posterior subserosal ELIER fibroids, 2 1cm intramural fibroids at hysterotomy requiring myomectomy for hysterotomy closure. Multiple other smaller intramural fibroids.   Grossly normal fallopian tubes and ovaries.     Total EBL: 2L on L&D, 2L intraop   IVF: 2.5L crystalloid  4u pRBCs + 2u FFP + 1u PLT  UOP: 515cc    /Attending:    I was present during the evaluation of this pt and the entire case and I agree with the above delivery summary.    Dr. Holguin After transfer from triage to LDR, patient noted to have brisk bright red vaginal bleeding with grossly visualized PPROM. Total EBL on L&D from time of presentation approximately 2L. As A-line being placed, patient noted to be acutely hypotensive with vasovagal episode. Decision made to proceed with emergency classical  section for uterine evacuation 2/2 complete placenta previa and life threatening hemorrhage. Initially patient declining intervention in effort to prolong pregnancy, however eventually patient and partner consented to surgery. MTP called and patient moved emergently to main OR (see prior notes for more details on preop course).     Patient underwent primary classical  section.   Female fetus delivered from the vertex presentation, appeared limp, apneic with HR in 30s upon delivery, handed to awaiting L&D nurses for comfort care given pre-viability.   Hysterotomy closed in multiple layers using vicryl. Reinforced in multiple locations due to close proximity of fibroids to hysterotomy. Surgicel powder placed over hysterotomy. Patient given TXA, Methergine and Pitocin with improvement in uterine tone.   Uterus with multiple fibroids: 5cm L fundal pedunculated, 3cm R fundal subserosal, 3cm ELIER subserosal, 2 3cm posterior subserosal ELIER fibroids, 2 1cm intramural fibroids at hysterotomy requiring myomectomy for hysterotomy closure. Multiple other smaller intramural fibroids.   Grossly normal fallopian tubes and ovaries.     Total EBL: 2L on L&D, 2L intraop   IVF: 2.5L crystalloid  4u pRBCs + 2u FFP + 1u PLT  UOP: 515cc    /Attending:    I was present during the evaluation of this pt and the entire case and I agree with the above delivery summary.    Dr. Holguin

## 2022-06-19 LAB
ALBUMIN SERPL ELPH-MCNC: 2.3 G/DL — LOW (ref 3.3–5)
ALBUMIN SERPL ELPH-MCNC: 2.3 G/DL — LOW (ref 3.3–5)
ALP SERPL-CCNC: 77 U/L — SIGNIFICANT CHANGE UP (ref 40–120)
ALP SERPL-CCNC: 83 U/L — SIGNIFICANT CHANGE UP (ref 40–120)
ALT FLD-CCNC: 9 U/L — LOW (ref 10–45)
ALT FLD-CCNC: 9 U/L — LOW (ref 10–45)
ANION GAP SERPL CALC-SCNC: 11 MMOL/L — SIGNIFICANT CHANGE UP (ref 5–17)
ANION GAP SERPL CALC-SCNC: 12 MMOL/L — SIGNIFICANT CHANGE UP (ref 5–17)
ANISOCYTOSIS BLD QL: SLIGHT — SIGNIFICANT CHANGE UP
APPEARANCE UR: CLEAR — SIGNIFICANT CHANGE UP
APTT BLD: 25.7 SEC — LOW (ref 27.5–35.5)
APTT BLD: 27.6 SEC — SIGNIFICANT CHANGE UP (ref 27.5–35.5)
AST SERPL-CCNC: 31 U/L — SIGNIFICANT CHANGE UP (ref 10–40)
AST SERPL-CCNC: 33 U/L — SIGNIFICANT CHANGE UP (ref 10–40)
BACTERIA # UR AUTO: NEGATIVE — SIGNIFICANT CHANGE UP
BASOPHILS # BLD AUTO: 0 K/UL — SIGNIFICANT CHANGE UP (ref 0–0.2)
BASOPHILS NFR BLD AUTO: 0 % — SIGNIFICANT CHANGE UP (ref 0–2)
BILIRUB SERPL-MCNC: 0.2 MG/DL — SIGNIFICANT CHANGE UP (ref 0.2–1.2)
BILIRUB SERPL-MCNC: 0.4 MG/DL — SIGNIFICANT CHANGE UP (ref 0.2–1.2)
BILIRUB UR-MCNC: NEGATIVE — SIGNIFICANT CHANGE UP
BUN SERPL-MCNC: 6 MG/DL — LOW (ref 7–23)
BUN SERPL-MCNC: 6 MG/DL — LOW (ref 7–23)
CALCIUM SERPL-MCNC: 7.7 MG/DL — LOW (ref 8.4–10.5)
CALCIUM SERPL-MCNC: 7.8 MG/DL — LOW (ref 8.4–10.5)
CHLORIDE SERPL-SCNC: 104 MMOL/L — SIGNIFICANT CHANGE UP (ref 96–108)
CHLORIDE SERPL-SCNC: 106 MMOL/L — SIGNIFICANT CHANGE UP (ref 96–108)
CO2 SERPL-SCNC: 19 MMOL/L — LOW (ref 22–31)
CO2 SERPL-SCNC: 20 MMOL/L — LOW (ref 22–31)
COLOR SPEC: YELLOW — SIGNIFICANT CHANGE UP
CREAT SERPL-MCNC: 0.47 MG/DL — LOW (ref 0.5–1.3)
CREAT SERPL-MCNC: 0.63 MG/DL — SIGNIFICANT CHANGE UP (ref 0.5–1.3)
DACRYOCYTES BLD QL SMEAR: SLIGHT — SIGNIFICANT CHANGE UP
DIFF PNL FLD: ABNORMAL
EGFR: 116 ML/MIN/1.73M2 — SIGNIFICANT CHANGE UP
EGFR: 124 ML/MIN/1.73M2 — SIGNIFICANT CHANGE UP
ELLIPTOCYTES BLD QL SMEAR: SLIGHT — SIGNIFICANT CHANGE UP
EOSINOPHIL # BLD AUTO: 0 K/UL — SIGNIFICANT CHANGE UP (ref 0–0.5)
EOSINOPHIL NFR BLD AUTO: 0 % — SIGNIFICANT CHANGE UP (ref 0–6)
EPI CELLS # UR: 8 /HPF — HIGH
FIBRINOGEN PPP-MCNC: 553 MG/DL — HIGH (ref 330–520)
GIANT PLATELETS BLD QL SMEAR: PRESENT — SIGNIFICANT CHANGE UP
GLUCOSE SERPL-MCNC: 89 MG/DL — SIGNIFICANT CHANGE UP (ref 70–99)
GLUCOSE SERPL-MCNC: 97 MG/DL — SIGNIFICANT CHANGE UP (ref 70–99)
GLUCOSE UR QL: NEGATIVE — SIGNIFICANT CHANGE UP
HCT VFR BLD CALC: 21.1 % — LOW (ref 34.5–45)
HCT VFR BLD CALC: 23.8 % — LOW (ref 34.5–45)
HCT VFR BLD CALC: 24.4 % — LOW (ref 34.5–45)
HGB BLD-MCNC: 6.9 G/DL — CRITICAL LOW (ref 11.5–15.5)
HGB BLD-MCNC: 7.9 G/DL — LOW (ref 11.5–15.5)
HGB BLD-MCNC: 8.1 G/DL — LOW (ref 11.5–15.5)
HYALINE CASTS # UR AUTO: 9 /LPF — HIGH (ref 0–2)
INR BLD: 1.09 RATIO — SIGNIFICANT CHANGE UP (ref 0.88–1.16)
INR BLD: 1.2 RATIO — HIGH (ref 0.88–1.16)
KETONES UR-MCNC: ABNORMAL
LACTATE SERPL-SCNC: 1.2 MMOL/L — SIGNIFICANT CHANGE UP (ref 0.7–2)
LEUKOCYTE ESTERASE UR-ACNC: ABNORMAL
LYMPHOCYTES # BLD AUTO: 1.22 K/UL — SIGNIFICANT CHANGE UP (ref 1–3.3)
LYMPHOCYTES # BLD AUTO: 4.3 % — LOW (ref 13–44)
MACROCYTES BLD QL: SLIGHT — SIGNIFICANT CHANGE UP
MAGNESIUM SERPL-MCNC: 1.5 MG/DL — LOW (ref 1.6–2.6)
MAGNESIUM SERPL-MCNC: 1.5 MG/DL — LOW (ref 1.6–2.6)
MANUAL SMEAR VERIFICATION: SIGNIFICANT CHANGE UP
MCHC RBC-ENTMCNC: 27.7 PG — SIGNIFICANT CHANGE UP (ref 27–34)
MCHC RBC-ENTMCNC: 27.7 PG — SIGNIFICANT CHANGE UP (ref 27–34)
MCHC RBC-ENTMCNC: 28 PG — SIGNIFICANT CHANGE UP (ref 27–34)
MCHC RBC-ENTMCNC: 32.7 GM/DL — SIGNIFICANT CHANGE UP (ref 32–36)
MCHC RBC-ENTMCNC: 33.2 GM/DL — SIGNIFICANT CHANGE UP (ref 32–36)
MCHC RBC-ENTMCNC: 33.2 GM/DL — SIGNIFICANT CHANGE UP (ref 32–36)
MCV RBC AUTO: 83.5 FL — SIGNIFICANT CHANGE UP (ref 80–100)
MCV RBC AUTO: 84.4 FL — SIGNIFICANT CHANGE UP (ref 80–100)
MCV RBC AUTO: 84.7 FL — SIGNIFICANT CHANGE UP (ref 80–100)
MICROCYTES BLD QL: SLIGHT — SIGNIFICANT CHANGE UP
MONOCYTES # BLD AUTO: 1.95 K/UL — HIGH (ref 0–0.9)
MONOCYTES NFR BLD AUTO: 6.9 % — SIGNIFICANT CHANGE UP (ref 2–14)
NEUTROPHILS # BLD AUTO: 25.15 K/UL — HIGH (ref 1.8–7.4)
NEUTROPHILS NFR BLD AUTO: 87.1 % — HIGH (ref 43–77)
NEUTS BAND # BLD: 1.7 % — SIGNIFICANT CHANGE UP (ref 0–8)
NITRITE UR-MCNC: NEGATIVE — SIGNIFICANT CHANGE UP
NRBC # BLD: 0 /100 WBCS — SIGNIFICANT CHANGE UP (ref 0–0)
NRBC # BLD: 0 /100 WBCS — SIGNIFICANT CHANGE UP (ref 0–0)
PH UR: 6.5 — SIGNIFICANT CHANGE UP (ref 5–8)
PHOSPHATE SERPL-MCNC: 3.1 MG/DL — SIGNIFICANT CHANGE UP (ref 2.5–4.5)
PHOSPHATE SERPL-MCNC: 3.2 MG/DL — SIGNIFICANT CHANGE UP (ref 2.5–4.5)
PLAT MORPH BLD: NORMAL — SIGNIFICANT CHANGE UP
PLATELET # BLD AUTO: 250 K/UL — SIGNIFICANT CHANGE UP (ref 150–400)
PLATELET # BLD AUTO: 250 K/UL — SIGNIFICANT CHANGE UP (ref 150–400)
PLATELET # BLD AUTO: 265 K/UL — SIGNIFICANT CHANGE UP (ref 150–400)
POIKILOCYTOSIS BLD QL AUTO: SLIGHT — SIGNIFICANT CHANGE UP
POLYCHROMASIA BLD QL SMEAR: SLIGHT — SIGNIFICANT CHANGE UP
POTASSIUM SERPL-MCNC: 3.6 MMOL/L — SIGNIFICANT CHANGE UP (ref 3.5–5.3)
POTASSIUM SERPL-MCNC: 4.2 MMOL/L — SIGNIFICANT CHANGE UP (ref 3.5–5.3)
POTASSIUM SERPL-SCNC: 3.6 MMOL/L — SIGNIFICANT CHANGE UP (ref 3.5–5.3)
POTASSIUM SERPL-SCNC: 4.2 MMOL/L — SIGNIFICANT CHANGE UP (ref 3.5–5.3)
PROT SERPL-MCNC: 4.4 G/DL — LOW (ref 6–8.3)
PROT SERPL-MCNC: 4.8 G/DL — LOW (ref 6–8.3)
PROT UR-MCNC: ABNORMAL
PROTHROM AB SERPL-ACNC: 12.6 SEC — SIGNIFICANT CHANGE UP (ref 10.5–13.4)
PROTHROM AB SERPL-ACNC: 14 SEC — HIGH (ref 10.5–13.4)
RBC # BLD: 2.49 M/UL — LOW (ref 3.8–5.2)
RBC # BLD: 2.85 M/UL — LOW (ref 3.8–5.2)
RBC # BLD: 2.89 M/UL — LOW (ref 3.8–5.2)
RBC # FLD: 19.7 % — HIGH (ref 10.3–14.5)
RBC # FLD: 19.9 % — HIGH (ref 10.3–14.5)
RBC # FLD: 22 % — HIGH (ref 10.3–14.5)
RBC BLD AUTO: ABNORMAL
RBC CASTS # UR COMP ASSIST: 174 /HPF — HIGH (ref 0–4)
SODIUM SERPL-SCNC: 135 MMOL/L — SIGNIFICANT CHANGE UP (ref 135–145)
SODIUM SERPL-SCNC: 137 MMOL/L — SIGNIFICANT CHANGE UP (ref 135–145)
SP GR SPEC: 1.03 — HIGH (ref 1.01–1.02)
UROBILINOGEN FLD QL: NEGATIVE — SIGNIFICANT CHANGE UP
WBC # BLD: 19.2 K/UL — HIGH (ref 3.8–10.5)
WBC # BLD: 28.26 K/UL — HIGH (ref 3.8–10.5)
WBC # BLD: 28.32 K/UL — HIGH (ref 3.8–10.5)
WBC # FLD AUTO: 19.2 K/UL — HIGH (ref 3.8–10.5)
WBC # FLD AUTO: 28.26 K/UL — HIGH (ref 3.8–10.5)
WBC # FLD AUTO: 28.32 K/UL — HIGH (ref 3.8–10.5)
WBC UR QL: 51 /HPF — HIGH (ref 0–5)

## 2022-06-19 PROCEDURE — 99232 SBSQ HOSP IP/OBS MODERATE 35: CPT | Mod: 25

## 2022-06-19 RX ORDER — METOCLOPRAMIDE HCL 10 MG
10 TABLET ORAL ONCE
Refills: 0 | Status: COMPLETED | OUTPATIENT
Start: 2022-06-19 | End: 2022-06-19

## 2022-06-19 RX ORDER — METOCLOPRAMIDE HCL 10 MG
10 TABLET ORAL EVERY 8 HOURS
Refills: 0 | Status: DISCONTINUED | OUTPATIENT
Start: 2022-06-19 | End: 2022-06-21

## 2022-06-19 RX ORDER — SODIUM CHLORIDE 9 MG/ML
1000 INJECTION, SOLUTION INTRAVENOUS
Refills: 0 | Status: DISCONTINUED | OUTPATIENT
Start: 2022-06-19 | End: 2022-06-20

## 2022-06-19 RX ORDER — OXYCODONE HYDROCHLORIDE 5 MG/1
5 TABLET ORAL EVERY 6 HOURS
Refills: 0 | Status: DISCONTINUED | OUTPATIENT
Start: 2022-06-19 | End: 2022-06-21

## 2022-06-19 RX ORDER — ONDANSETRON 8 MG/1
6 TABLET, FILM COATED ORAL ONCE
Refills: 0 | Status: COMPLETED | OUTPATIENT
Start: 2022-06-19 | End: 2022-06-19

## 2022-06-19 RX ORDER — DIPHENHYDRAMINE HCL 50 MG
25 CAPSULE ORAL ONCE
Refills: 0 | Status: COMPLETED | OUTPATIENT
Start: 2022-06-19 | End: 2022-06-19

## 2022-06-19 RX ORDER — ACETAMINOPHEN 500 MG
1000 TABLET ORAL ONCE
Refills: 0 | Status: COMPLETED | OUTPATIENT
Start: 2022-06-20 | End: 2022-06-20

## 2022-06-19 RX ORDER — BENZOCAINE AND MENTHOL 5; 1 G/100ML; G/100ML
1 LIQUID ORAL THREE TIMES A DAY
Refills: 0 | Status: DISCONTINUED | OUTPATIENT
Start: 2022-06-19 | End: 2022-06-21

## 2022-06-19 RX ORDER — SODIUM CHLORIDE 9 MG/ML
1000 INJECTION, SOLUTION INTRAVENOUS ONCE
Refills: 0 | Status: COMPLETED | OUTPATIENT
Start: 2022-06-19 | End: 2022-06-19

## 2022-06-19 RX ORDER — HYDROMORPHONE HYDROCHLORIDE 2 MG/ML
1 INJECTION INTRAMUSCULAR; INTRAVENOUS; SUBCUTANEOUS EVERY 6 HOURS
Refills: 0 | Status: DISCONTINUED | OUTPATIENT
Start: 2022-06-19 | End: 2022-06-21

## 2022-06-19 RX ORDER — ONDANSETRON 8 MG/1
4 TABLET, FILM COATED ORAL EVERY 6 HOURS
Refills: 0 | Status: DISCONTINUED | OUTPATIENT
Start: 2022-06-19 | End: 2022-06-19

## 2022-06-19 RX ORDER — MAGNESIUM SULFATE 500 MG/ML
2 VIAL (ML) INJECTION ONCE
Refills: 0 | Status: COMPLETED | OUTPATIENT
Start: 2022-06-19 | End: 2022-06-20

## 2022-06-19 RX ORDER — ERTAPENEM SODIUM 1 G/1
1000 INJECTION, POWDER, LYOPHILIZED, FOR SOLUTION INTRAMUSCULAR; INTRAVENOUS EVERY 24 HOURS
Refills: 0 | Status: DISCONTINUED | OUTPATIENT
Start: 2022-06-19 | End: 2022-06-20

## 2022-06-19 RX ORDER — FAMOTIDINE 10 MG/ML
20 INJECTION INTRAVENOUS
Refills: 0 | Status: DISCONTINUED | OUTPATIENT
Start: 2022-06-19 | End: 2022-06-21

## 2022-06-19 RX ORDER — KETOROLAC TROMETHAMINE 30 MG/ML
30 SYRINGE (ML) INJECTION EVERY 6 HOURS
Refills: 0 | Status: DISCONTINUED | OUTPATIENT
Start: 2022-06-19 | End: 2022-06-20

## 2022-06-19 RX ORDER — ACETAMINOPHEN 500 MG
1000 TABLET ORAL ONCE
Refills: 0 | Status: COMPLETED | OUTPATIENT
Start: 2022-06-19 | End: 2022-06-19

## 2022-06-19 RX ORDER — HEPARIN SODIUM 5000 [USP'U]/ML
5000 INJECTION INTRAVENOUS; SUBCUTANEOUS EVERY 12 HOURS
Refills: 0 | Status: DISCONTINUED | OUTPATIENT
Start: 2022-06-19 | End: 2022-06-21

## 2022-06-19 RX ORDER — GABAPENTIN 400 MG/1
200 CAPSULE ORAL EVERY 8 HOURS
Refills: 0 | Status: DISCONTINUED | OUTPATIENT
Start: 2022-06-19 | End: 2022-06-21

## 2022-06-19 RX ADMIN — Medication 400 MILLIGRAM(S): at 11:04

## 2022-06-19 RX ADMIN — Medication 15 MILLIGRAM(S): at 19:40

## 2022-06-19 RX ADMIN — Medication 400 MILLIGRAM(S): at 21:30

## 2022-06-19 RX ADMIN — Medication 15 MILLIGRAM(S): at 12:49

## 2022-06-19 RX ADMIN — Medication 25 MILLIGRAM(S): at 15:57

## 2022-06-19 RX ADMIN — Medication 15 MILLIGRAM(S): at 19:24

## 2022-06-19 RX ADMIN — Medication 1000 MILLIGRAM(S): at 22:45

## 2022-06-19 RX ADMIN — Medication 15 MILLIGRAM(S): at 05:54

## 2022-06-19 RX ADMIN — Medication 400 MILLIGRAM(S): at 15:57

## 2022-06-19 RX ADMIN — ONDANSETRON 6 MILLIGRAM(S): 8 TABLET, FILM COATED ORAL at 15:56

## 2022-06-19 RX ADMIN — Medication 1000 MILLIGRAM(S): at 16:53

## 2022-06-19 RX ADMIN — Medication 400 MILLIGRAM(S): at 05:55

## 2022-06-19 RX ADMIN — Medication 10 MILLIGRAM(S): at 11:04

## 2022-06-19 RX ADMIN — Medication 400 MILLIGRAM(S): at 00:10

## 2022-06-19 RX ADMIN — ERTAPENEM SODIUM 120 MILLIGRAM(S): 1 INJECTION, POWDER, LYOPHILIZED, FOR SOLUTION INTRAMUSCULAR; INTRAVENOUS at 20:43

## 2022-06-19 RX ADMIN — Medication 1000 MILLIGRAM(S): at 11:35

## 2022-06-19 RX ADMIN — HEPARIN SODIUM 5000 UNIT(S): 5000 INJECTION INTRAVENOUS; SUBCUTANEOUS at 22:07

## 2022-06-19 RX ADMIN — ONDANSETRON 4 MILLIGRAM(S): 8 TABLET, FILM COATED ORAL at 12:50

## 2022-06-19 RX ADMIN — SODIUM CHLORIDE 1000 MILLILITER(S): 9 INJECTION, SOLUTION INTRAVENOUS at 22:07

## 2022-06-19 RX ADMIN — Medication 15 MILLIGRAM(S): at 13:41

## 2022-06-19 RX ADMIN — FAMOTIDINE 20 MILLIGRAM(S): 10 INJECTION INTRAVENOUS at 12:49

## 2022-06-19 RX ADMIN — SODIUM CHLORIDE 125 MILLILITER(S): 9 INJECTION, SOLUTION INTRAVENOUS at 23:57

## 2022-06-19 RX ADMIN — Medication 1000 MILLIGRAM(S): at 07:11

## 2022-06-19 RX ADMIN — Medication 15 MILLIGRAM(S): at 07:11

## 2022-06-19 RX ADMIN — Medication 15 MILLIGRAM(S): at 00:11

## 2022-06-19 RX ADMIN — Medication 10 MILLIGRAM(S): at 20:43

## 2022-06-19 NOTE — PROGRESS NOTE ADULT - ASSESSMENT
38yo  POD#1 s/p pCCS & myomectomy (EBL 4000) @21w5d under GETA for hemodynamic instability 2/2 hemorrhagic complete placenta previa / PPROM s/p  4uPRBC + 2 FFP + 1 Plt intraop and SICU admission () for hemodynamic instability.  Patient in stable condition, VSS, asymptomatic, meeting appropriate postpartum milestones.    #Acute blood loss anemia   - Elevated lactate, resolved: 3.2->2.8->2.7->1.5  - Monitor Hct: 28.7->(4uPRBC)->24.5->24.2->21.6     - Monitor Plt: 492->(1Plt)->309->320->277    - F/u AM CBC/CMP/Coags       #Fetal loss  - SW consult   - [place] paperwork completed     #Postoperative state  - IV Tylenol, Toradol, Dilaudid PCA for pain control   - Incision dressing removed    #FEN/GI  - KVO  - Reg diet  - D/c Tariq pending AM labs    #Ppx  - DVT: SCDs, early ambulation, will start HSQ if H/H stable    Aidee Hoskins PGY 2

## 2022-06-19 NOTE — PHYSICAL THERAPY INITIAL EVALUATION ADULT - GENERAL OBSERVATIONS, REHAB EVAL
Rec'd semifowler's in bed, + LUE IV, dressing to abd clean dry and intact, donned underwear in bed / pt has abd binder at b/s and nurse will assist pt to don it later in the day

## 2022-06-19 NOTE — PROVIDER CONTACT NOTE (OTHER) - ACTION/TREATMENT ORDERED:
blood & urine ctx sent with other labs  1 L bolus, IV invanz, IV tylenol to be given  continue with vitals Q 4 hours  pt NPO at this time

## 2022-06-19 NOTE — CHART NOTE - NSCHARTNOTEFT_GEN_A_CORE
Patient seen and evaluated at the bedside this morning. Patient complains of abdominal pain. She was able to sleep through the night, but has been having increased abdominal pain since getting out of bed and into the chair. Patient also complains of lightheadedness and dizziness, and nausea. H/H this AM noted to have downtrended. She has vomited small amounts of bilious liquid. She is not passing flatus, and has only attempted minimal amounts of regular diet.     Vital Signs Last 24 Hrs  T(C): 36.9 (19 Jun 2022 05:45), Max: 37.4 (19 Jun 2022 01:00)  T(F): 98.5 (19 Jun 2022 05:45), Max: 99.3 (19 Jun 2022 01:00)  HR: 100 (19 Jun 2022 09:09) (90 - 102)  BP: 103/67 (19 Jun 2022 09:07) (100/60 - 116/69)  BP(mean): 85 (18 Jun 2022 15:00) (78 - 87)  RR: 20 (19 Jun 2022 09:09) (15 - 20)  SpO2: 98% (19 Jun 2022 09:09) (96% - 99%)    Physical Exam Patient seen and evaluated at the bedside this morning. Patient complains of abdominal pain. She was able to sleep through the night, but has been having increased abdominal pain since getting out of bed and into the chair. Patient also complains of lightheadedness and dizziness, and nausea. H/H this AM noted to have downtrended. She has vomited small amounts of bilious liquid. She is not passing flatus, and has only attempted minimal amounts of regular diet.     Vital Signs Last 24 Hrs  T(C): 36.9 (2022 05:45), Max: 37.4 (2022 01:00)  T(F): 98.5 (2022 05:45), Max: 99.3 (2022 01:00)  HR: 100 (2022 09:09) (90 - 102)  BP: 103/67 (2022 09:07) (100/60 - 116/69)  BP(mean): 85 (2022 15:00) (78 - 87)  RR: 20 (2022 09:09) (15 - 20)  SpO2: 98% (2022 09:09) (96% - 99%)    Physical Exam  Constitutional: appears uncomfortable and in pain  CV: RRR  Resp: normal work of breathing  Abd: soft, but diffusely tender to palpation, predominantly in lower abdomen. No rebound tenderness or guarding.   : minimal vaginal bleeding, no uterine fundal tenderness  Bedside TAUS: No evidence of hemoperitoneum. Bowel peristalsis visualized. Uterine cavity grossly wnl without obvious distention.   Ext: nontender, nonerythematous bilaterally    Assessment: 38yo  POD#1 s/p pCCS & myomectomy (EBL 4000) @21w5d under GETA for hemodynamic instability 2/2 hemorrhagic complete placenta previa / PPROM s/p  4uPRBC + 2 FFP + 1 Plt intraop and SICU admission () for hemodynamic instability. Patient evaluated at the bedside for nausea/vomiting, and symptomatic anemia. FAST scan negative, vital signs stable.     Plan  - Administer Reglan for nausea now  - Zofran PRN for continued nausea/vomiting  - d/c PCA and transition to standing IV Tylenol and Toradol, and Oxy PRN  - Administer 1uPRBC  - Follow-up H/H w/ 12pm CBC    d/w and evaluated w/ MFM attending Dr. Landry, and d/w Dr. Kem Ruiz PGY3 Patient seen and evaluated at the bedside this morning. Patient complains of abdominal pain. She was able to sleep through the night, but has been having increased abdominal pain since getting out of bed and into the chair. Patient also complains of lightheadedness and dizziness, and nausea. H/H this AM noted to have downtrended. She has vomited small amounts of bilious liquid. She is not passing flatus, and has only attempted minimal amounts of regular diet.     Vital Signs Last 24 Hrs  T(C): 36.9 (2022 05:45), Max: 37.4 (2022 01:00)  T(F): 98.5 (2022 05:45), Max: 99.3 (2022 01:00)  HR: 100 (2022 09:09) (90 - 102)  BP: 103/67 (2022 09:07) (100/60 - 116/69)  BP(mean): 85 (2022 15:00) (78 - 87)  RR: 20 (2022 09:09) (15 - 20)  SpO2: 98% (2022 09:09) (96% - 99%)    Physical Exam  Constitutional: appears uncomfortable and in pain  CV: RRR  Resp: normal work of breathing  Abd: soft, but diffusely tender to palpation, predominantly in lower abdomen. No rebound tenderness or guarding.   : minimal vaginal bleeding, no uterine fundal tenderness  Bedside TAUS: No evidence of hemoperitoneum. Bowel peristalsis visualized. Uterine cavity grossly wnl without obvious distention.   Ext: nontender, nonerythematous bilaterally    Assessment: 36yo  POD#1 s/p pCCS & myomectomy (EBL 4000) @21w5d under GETA for hemodynamic instability 2/2 hemorrhagic complete placenta previa / PPROM s/p  4uPRBC + 2 FFP + 1 Plt intraop and SICU admission () for hemodynamic instability. Patient evaluated at the bedside for nausea/vomiting, and symptomatic anemia. FAST scan negative, vital signs stable.     Plan  - Administer Reglan for nausea now  - Zofran PRN for continued nausea/vomiting  - d/c PCA and transition to standing IV Tylenol and Toradol, and Oxy PRN  - Administer 1uPRBC  - Follow-up H/H w/ 12pm CBC    d/w and evaluated w/ MFM attending Dr. Landry, and d/w Dr. Kem Ruiz PGY3    MFM attendinyo  is now POD#1 s/p classical /myomectomy in setting of 21w5d hemorrhaging placenta previa w/ hemodynamic instability   S/p crystalloids and transfusion of 4u pRBC 2u FFP 1u plts.    Initially management in the SICU, transferred to the floor yesterday.   Vitals stable, Adequate urine output, hidalgo in place, abdomen soft, tender to palpation, incision intact, minimal vaginal bleeding.   Discussed pain management with Pain team. Will d/c PCA after 24h, transition to oxycodone to provide longer coverage, dilaudid PRN for breakthrough pain, standing dose of tylenol and toradol. Encouraged Po intake and transition to PO meds.   Pt currently reports nausea and dizziness. Will give one dose of reglan and order zofran PRN.   Discussed transfusion of 1 unit of PRBCs and repeat labs in ~ 4 h.   Bedside abdominal sonogram will no evidence of free fluid.  Will continue to monitor vitals and UO closely at this time.   Encouraged OOB and once able to ambulate can discontinue hidalgo.     Dr Reid Chavez.    I was present and participate din all key portions of assessment and plan. Agree with resident's note.

## 2022-06-19 NOTE — PROVIDER CONTACT NOTE (OTHER) - ASSESSMENT
pt reported vomiting clear emesis at 1900. pt reports increased abdominal pain. , temp 100.4F, /69, resp 19, spo2 96%. pt denies chest pain/shortness of breath. Pt reports she is not passing gas at this time. Tariq catheter has adequate urine output.

## 2022-06-19 NOTE — PHYSICAL THERAPY INITIAL EVALUATION ADULT - CRITERIA FOR SKILLED THERAPEUTIC INTERVENTIONS
home PT for bed mobs, transfers, amb training, balance training and ther exercises /rolling walker/impairments found/anticipated discharge recommendation

## 2022-06-19 NOTE — PHYSICAL THERAPY INITIAL EVALUATION ADULT - ADDITIONAL COMMENTS
lives in private home w /boyfriend, has 3 steps to enter and flight of stairs in home both w/ handrails, pt wears glasses all the time, hearing is good and pt is right handed

## 2022-06-19 NOTE — CHART NOTE - NSCHARTNOTEFT_GEN_A_CORE
R3 Chart note    Patient seen at bedside for examination with T 37.8, , complaints of N/V, abdominal pain and abdominal cramps. Reports subjective fevers and unable to tolerate PO 2/2 nausea. Her PCA was dc'ed at 3p and she was transitioned to Tylenol, Toradol and Oxy/Dilaudid prn.   Denies headaches, CP, SOB, vaginal bleeding and edema.     Vital Signs Last 24 Hrs  T(C): 37.8 (:), Max: 37.8 (2022 19:)  T(F): 100.1 (:), Max: 100.1 (:)  HR: 117 (:) (89 - 117)  BP: 110/74 (:) (95/60 - 110/74)  BP(mean): --  RR: 18 (:) (18 - 20)  SpO2: 98% (:) (96% - 99%)    Physical Exam  Constitutional: appears uncomfortable and in pain  CV: RRR  Resp: normal work of breathing  Abd: soft, but diffusely tender to palpation, predominantly in lower abdomen. No rebound tenderness or guarding.   Incision: c/d/i  : minimal vaginal bleeding, no uterine fundal tenderness  Ext: nontender, nonerythematous bilaterally with SCDs in place                8.1    28.26 )-----------( 250      (  @ 16:50 )             24.4                6.9    19.20 )-----------( 265      (  @ 07:47 )             21.1                7.3    18.86 )-----------( 277      (  @ 18:32 )             21.6         Assessment: 38yo  POD#1 s/p pCCS & myomectomy (EBL 4000) @21w5d under GETA for hemodynamic instability 2/2 hemorrhagic complete placenta previa / PPROM s/p  4uPRBC + 2 FFP + 1 Plt intraop and SICU admission () for hemodynamic instability. Additional 1u pRBC given today . WBC uptrending 19.2->28.26. Patient evaluated at the bedside for nausea/vomiting, and abdominal pain.      Plan  - STAT BCx, UCx  - Begin Invanz for presumed endometritis   - Administer Reglan for nausea now  - Zofran PRN for continued nausea/vomiting  - c/w IV Tylenol and Toradol, and Oxy PRN  - AM CBC/BMP/Mg/P/Coags     seen and d/w Dr. Arzola and Dr. Mai PG4  Berta Patton PGY3 R3 Chart note    Patient seen at bedside for examination with T 37.8, , complaints of N/V, abdominal pain and abdominal cramps. Reports subjective fevers and unable to tolerate PO 2/2 nausea. Her PCA was dc'ed at 3p and she was transitioned to Tylenol, Toradol and Oxy/Dilaudid prn.   Denies headaches, CP, SOB, vaginal bleeding and edema.     Vital Signs Last 24 Hrs  T(C): 37.8 (:), Max: 37.8 (2022 19:)  T(F): 100.1 (:), Max: 100.1 (:)  HR: 117 (:) (89 - 117)  BP: 110/74 (:) (95/60 - 110/74)  BP(mean): --  RR: 18 (:) (18 - 20)  SpO2: 98% (:) (96% - 99%)    Physical Exam  Constitutional: appears uncomfortable and in pain  CV: RRR  Resp: normal work of breathing  Abd: soft, but diffusely tender to palpation, predominantly in lower abdomen. No rebound tenderness or guarding.   Incision: c/d/i  : minimal vaginal bleeding, no uterine fundal tenderness  Ext: nontender, nonerythematous bilaterally with SCDs in place                8.1    28.26 )-----------( 250      (  @ 16:50 )             24.4                6.9    19.20 )-----------( 265      (  @ 07:47 )             21.1                7.3    18.86 )-----------( 277      (  @ 18:32 )             21.6         Assessment: 36yo  POD#1 s/p pCCS & myomectomy (EBL 4000) @21w5d under GETA for hemodynamic instability 2/2 hemorrhagic complete placenta previa / PPROM s/p  4uPRBC + 2 FFP + 1 Plt intraop and SICU admission () for hemodynamic instability. Additional 1u pRBC given today 6/19. WBC uptrending 19.2->28.26. Patient evaluated at the bedside for nausea/vomiting, and abdominal pain.      Plan  - STAT BCx x2  - Begin Invanz for presumed endometritis   - Administer Reglan for nausea now  - Zofran PRN for continued nausea/vomiting  - c/w IV Tylenol and Toradol, and Oxy PRN  - AM CBC/BMP/Mg/P/Coags     seen and d/w Dr. Arzola and Dr. Mai PG4  Berta Patton PGY3 R3 Chart note    Patient seen at bedside for examination with T 37.8, , complaints of N/V, abdominal pain and abdominal cramps. Reports subjective fevers and unable to tolerate PO 2/2 nausea. Her PCA was dc'ed at 3p and she was transitioned to Tylenol, Toradol and Oxy/Dilaudid prn.   Denies headaches, CP, SOB, vaginal bleeding and edema.     Vital Signs Last 24 Hrs  T(C): 37.8 (:), Max: 37.8 (2022 19:)  T(F): 100.1 (:), Max: 100.1 (:)  HR: 117 (:) (89 - 117)  BP: 110/74 (:) (95/60 - 110/74)  BP(mean): --  RR: 18 (:) (18 - 20)  SpO2: 98% (:) (96% - 99%)    Physical Exam  Constitutional: appears uncomfortable and in pain  CV: RRR  Resp: normal work of breathing  Abd: soft, but diffusely tender to palpation, predominantly in lower abdomen. No rebound tenderness or guarding.   Incision: c/d/i  : minimal vaginal bleeding, no uterine fundal tenderness  Ext: nontender, nonerythematous bilaterally with SCDs in place                8.1    28.26 )-----------( 250      (  @ 16:50 )             24.4                6.9    19.20 )-----------( 265      (  @ 07:47 )             21.1                7.3    18.86 )-----------( 277      (  @ 18:32 )             21.6         Assessment: 38yo  POD#1 s/p pCCS & myomectomy (EBL 4000) @21w5d under GETA for hemodynamic instability 2/2 hemorrhagic complete placenta previa / PPROM s/p  4uPRBC + 2 FFP + 1 Plt intraop and SICU admission () for hemodynamic instability. Additional 1u pRBC given today . WBC uptrending 19.2->28.26. Patient evaluated at the bedside for nausea/vomiting, and abdominal pain concern for postop ileus and endometritis.      Plan  - STAT BCx x2, UCx  - NPO, LR@125  - Urgent Abdominal Xray   - Begin Invanz for presumed endometritis   - Administer Reglan for nausea now  - Zofran PRN for continued nausea/vomiting  - c/w IV Tylenol and Toradol, and Oxy PRN  - STAT CBC/BMP/Mg/P/Coags     seen and d/w Dr. Brownlee and Dr. Mai PG4  Berta Patton PGY3 R3 Chart note    Patient seen at bedside for examination with T 37.8, , complaints of N/V, abdominal pain and abdominal cramps. Reports subjective fevers and unable to tolerate PO 2/2 nausea. Her PCA was dc'ed at 3p and she was transitioned to Tylenol, Toradol and Oxy/Dilaudid prn.   Denies headaches, CP, SOB, vaginal bleeding and edema.     Vital Signs Last 24 Hrs  T(C): 37.8 (:), Max: 37.8 (2022 19:)  T(F): 100.1 (:), Max: 100.1 (:)  HR: 117 (:) (89 - 117)  BP: 110/74 (:) (95/60 - 110/74)  BP(mean): --  RR: 18 (:) (18 - 20)  SpO2: 98% (:) (96% - 99%)    Physical Exam  Constitutional: appears uncomfortable and in pain  CV: RRR  Resp: normal work of breathing, CTABL  Abd: soft, but diffusely tender to palpation, moderately distended. No rebound tenderness or guarding.   Incision: c/d/i  : minimal vaginal bleeding, fundal tenderness  Ext: nontender, nonerythematous bilaterally with SCDs in place                8.1    28.26 )-----------( 250      (  @ 16:50 )             24.4                6.9    19.20 )-----------( 265      (  @ 07:47 )             21.1                7.3    18.86 )-----------( 277      (  @ 18:32 )             21.6         Assessment: 36yo  POD#1 s/p pCCS & myomectomy (EBL 4000) @21w5d under GETA for hemodynamic instability 2/2 hemorrhagic complete placenta previa / PPROM s/p  4uPRBC + 2 FFP + 1 Plt intraop and SICU admission () for hemodynamic instability. Additional 1u pRBC given today . WBC uptrending 19.2->28.26. Patient evaluated at the bedside for nausea/vomiting, and abdominal pain concern for postop ileus and endometritis.      Plan  - STAT BCx x2, UCx  - NPO, LR@125  - Urgent Abdominal Xray   - Begin Invanz for presumed endometritis   - Administer Reglan for nausea now  - Zofran PRN for continued nausea/vomiting  - c/w IV Tylenol and Toradol, and Oxy PRN  - STAT CBC/BMP/Mg/P/Coags     seen and d/w Dr. Brownlee and Dr. Mai PG4  Berta Patton PGY3 R3 Chart note    Patient seen at bedside for examination with T 37.8, , complaints of N/V, abdominal pain and abdominal cramps. Reports subjective fevers and unable to tolerate PO 2/2 nausea. Patient has not passed flatus yet. Her PCA was dc'ed at 3p and she was transitioned to Tylenol, Toradol and Oxy/Dilaudid prn.   Denies headaches, CP, SOB, vaginal bleeding and edema.     Vital Signs Last 24 Hrs  T(C): 37.8 (:), Max: 37.8 (:)  T(F): 100.1 (:), Max: 100.1 (:13)  HR: 117 (:) (89 - 117)  BP: 110/74 (:) (95/60 - 110/74)  BP(mean): --  RR: 18 (:) (18 - 20)  SpO2: 98% (:) (96% - 99%)    Physical Exam  Constitutional: appears uncomfortable and in pain  CV: RRR  Resp: normal work of breathing, CTABL  Abd: soft, but diffusely tender to palpation, moderately distended. No rebound tenderness or guarding.   Incision: c/d/i  : minimal vaginal bleeding, fundal tenderness  Ext: nontender, nonerythematous bilaterally with SCDs in place                8.1    28.26 )-----------( 250      (  @ 16:50 )             24.4                6.9    19.20 )-----------( 265      (  @ 07:47 )             21.1                7.3    18.86 )-----------( 277      (  @ 18:32 )             21.6         Assessment: 36yo  POD#1 s/p pCCS & myomectomy (EBL 4000) @21w5d under GETA for hemodynamic instability 2/2 hemorrhagic complete placenta previa / PPROM s/p  4uPRBC + 2 FFP + 1 Plt intraop and SICU admission () for hemodynamic instability. Additional 1u pRBC given today . WBC uptrending 19.2->28.26. Patient evaluated at the bedside for nausea/vomiting, and abdominal pain concern for postop ileus and endometritis.      Plan  - STAT BCx x2, UCx  - NPO, LR@125  - Urgent Abdominal Xray   - Begin Invanz for presumed endometritis   - Administer Reglan for nausea now  - Zofran PRN for continued nausea/vomiting  - c/w IV Tylenol and Toradol, and Oxy PRN  - STAT CBC/BMP/Mg/P/Coags     seen and d/w Dr. Brownlee and Dr. Mai PG4  Berta Patton PGY3

## 2022-06-20 LAB
ALBUMIN SERPL ELPH-MCNC: 1.9 G/DL — LOW (ref 3.3–5)
ALP SERPL-CCNC: 87 U/L — SIGNIFICANT CHANGE UP (ref 40–120)
ALT FLD-CCNC: 10 U/L — SIGNIFICANT CHANGE UP (ref 10–45)
ANION GAP SERPL CALC-SCNC: 10 MMOL/L — SIGNIFICANT CHANGE UP (ref 5–17)
APTT BLD: 27.9 SEC — SIGNIFICANT CHANGE UP (ref 27.5–35.5)
AST SERPL-CCNC: 25 U/L — SIGNIFICANT CHANGE UP (ref 10–40)
BASOPHILS # BLD AUTO: 0.04 K/UL — SIGNIFICANT CHANGE UP (ref 0–0.2)
BASOPHILS NFR BLD AUTO: 0.2 % — SIGNIFICANT CHANGE UP (ref 0–2)
BILIRUB SERPL-MCNC: 0.2 MG/DL — SIGNIFICANT CHANGE UP (ref 0.2–1.2)
BUN SERPL-MCNC: 6 MG/DL — LOW (ref 7–23)
CALCIUM SERPL-MCNC: 7.5 MG/DL — LOW (ref 8.4–10.5)
CHLORIDE SERPL-SCNC: 106 MMOL/L — SIGNIFICANT CHANGE UP (ref 96–108)
CO2 SERPL-SCNC: 20 MMOL/L — LOW (ref 22–31)
CREAT SERPL-MCNC: 0.45 MG/DL — LOW (ref 0.5–1.3)
CULTURE RESULTS: NO GROWTH — SIGNIFICANT CHANGE UP
EGFR: 125 ML/MIN/1.73M2 — SIGNIFICANT CHANGE UP
EOSINOPHIL # BLD AUTO: 0.03 K/UL — SIGNIFICANT CHANGE UP (ref 0–0.5)
EOSINOPHIL NFR BLD AUTO: 0.1 % — SIGNIFICANT CHANGE UP (ref 0–6)
FIBRINOGEN PPP-MCNC: 790 MG/DL — HIGH (ref 330–520)
GLUCOSE SERPL-MCNC: 94 MG/DL — SIGNIFICANT CHANGE UP (ref 70–99)
HCT VFR BLD CALC: 20.3 % — CRITICAL LOW (ref 34.5–45)
HCT VFR BLD CALC: 25.5 % — LOW (ref 34.5–45)
HGB BLD-MCNC: 6.6 G/DL — CRITICAL LOW (ref 11.5–15.5)
HGB BLD-MCNC: 8.3 G/DL — LOW (ref 11.5–15.5)
IMM GRANULOCYTES NFR BLD AUTO: 1.7 % — HIGH (ref 0–1.5)
INR BLD: 1.21 RATIO — HIGH (ref 0.88–1.16)
KLEIHAUER-BETKE CALCULATION: 0 % — SIGNIFICANT CHANGE UP (ref 0–0.3)
LYMPHOCYTES # BLD AUTO: 1.93 K/UL — SIGNIFICANT CHANGE UP (ref 1–3.3)
LYMPHOCYTES # BLD AUTO: 8.1 % — LOW (ref 13–44)
MAGNESIUM SERPL-MCNC: 2.1 MG/DL — SIGNIFICANT CHANGE UP (ref 1.6–2.6)
MCHC RBC-ENTMCNC: 27.4 PG — SIGNIFICANT CHANGE UP (ref 27–34)
MCHC RBC-ENTMCNC: 27.5 PG — SIGNIFICANT CHANGE UP (ref 27–34)
MCHC RBC-ENTMCNC: 32.5 GM/DL — SIGNIFICANT CHANGE UP (ref 32–36)
MCHC RBC-ENTMCNC: 32.5 GM/DL — SIGNIFICANT CHANGE UP (ref 32–36)
MCV RBC AUTO: 84.2 FL — SIGNIFICANT CHANGE UP (ref 80–100)
MCV RBC AUTO: 84.4 FL — SIGNIFICANT CHANGE UP (ref 80–100)
MONOCYTES # BLD AUTO: 1.46 K/UL — HIGH (ref 0–0.9)
MONOCYTES NFR BLD AUTO: 6.1 % — SIGNIFICANT CHANGE UP (ref 2–14)
NEUTROPHILS # BLD AUTO: 20 K/UL — HIGH (ref 1.8–7.4)
NEUTROPHILS NFR BLD AUTO: 83.8 % — HIGH (ref 43–77)
NRBC # BLD: 0 /100 WBCS — SIGNIFICANT CHANGE UP (ref 0–0)
NRBC # BLD: 0 /100 WBCS — SIGNIFICANT CHANGE UP (ref 0–0)
PHOSPHATE SERPL-MCNC: 3.2 MG/DL — SIGNIFICANT CHANGE UP (ref 2.5–4.5)
PLATELET # BLD AUTO: 248 K/UL — SIGNIFICANT CHANGE UP (ref 150–400)
PLATELET # BLD AUTO: 288 K/UL — SIGNIFICANT CHANGE UP (ref 150–400)
POTASSIUM SERPL-MCNC: 3.8 MMOL/L — SIGNIFICANT CHANGE UP (ref 3.5–5.3)
POTASSIUM SERPL-SCNC: 3.8 MMOL/L — SIGNIFICANT CHANGE UP (ref 3.5–5.3)
PROT SERPL-MCNC: 4.5 G/DL — LOW (ref 6–8.3)
PROTHROM AB SERPL-ACNC: 14.1 SEC — HIGH (ref 10.5–13.4)
RBC # BLD: 2.41 M/UL — LOW (ref 3.8–5.2)
RBC # BLD: 3.02 M/UL — LOW (ref 3.8–5.2)
RBC # FLD: 20.3 % — HIGH (ref 10.3–14.5)
RBC # FLD: 20.8 % — HIGH (ref 10.3–14.5)
SODIUM SERPL-SCNC: 136 MMOL/L — SIGNIFICANT CHANGE UP (ref 135–145)
SPECIMEN SOURCE: SIGNIFICANT CHANGE UP
WBC # BLD: 23.86 K/UL — HIGH (ref 3.8–10.5)
WBC # BLD: 27.25 K/UL — HIGH (ref 3.8–10.5)
WBC # FLD AUTO: 23.86 K/UL — HIGH (ref 3.8–10.5)
WBC # FLD AUTO: 27.25 K/UL — HIGH (ref 3.8–10.5)

## 2022-06-20 PROCEDURE — 88305 TISSUE EXAM BY PATHOLOGIST: CPT | Mod: 26

## 2022-06-20 PROCEDURE — 74019 RADEX ABDOMEN 2 VIEWS: CPT | Mod: 26

## 2022-06-20 RX ORDER — ASCORBIC ACID 60 MG
500 TABLET,CHEWABLE ORAL DAILY
Refills: 0 | Status: DISCONTINUED | OUTPATIENT
Start: 2022-06-20 | End: 2022-06-21

## 2022-06-20 RX ORDER — FERROUS SULFATE 325(65) MG
325 TABLET ORAL DAILY
Refills: 0 | Status: DISCONTINUED | OUTPATIENT
Start: 2022-06-20 | End: 2022-06-21

## 2022-06-20 RX ORDER — ACETAMINOPHEN 500 MG
975 TABLET ORAL EVERY 6 HOURS
Refills: 0 | Status: DISCONTINUED | OUTPATIENT
Start: 2022-06-20 | End: 2022-06-21

## 2022-06-20 RX ADMIN — FAMOTIDINE 20 MILLIGRAM(S): 10 INJECTION INTRAVENOUS at 12:54

## 2022-06-20 RX ADMIN — Medication 1000 MILLIGRAM(S): at 05:23

## 2022-06-20 RX ADMIN — HEPARIN SODIUM 5000 UNIT(S): 5000 INJECTION INTRAVENOUS; SUBCUTANEOUS at 22:19

## 2022-06-20 RX ADMIN — HEPARIN SODIUM 5000 UNIT(S): 5000 INJECTION INTRAVENOUS; SUBCUTANEOUS at 11:17

## 2022-06-20 RX ADMIN — Medication 30 MILLIGRAM(S): at 14:41

## 2022-06-20 RX ADMIN — Medication 30 MILLIGRAM(S): at 08:44

## 2022-06-20 RX ADMIN — Medication 30 MILLIGRAM(S): at 02:43

## 2022-06-20 RX ADMIN — Medication 400 MILLIGRAM(S): at 11:16

## 2022-06-20 RX ADMIN — Medication 325 MILLIGRAM(S): at 12:55

## 2022-06-20 RX ADMIN — Medication 1000 MILLIGRAM(S): at 11:50

## 2022-06-20 RX ADMIN — Medication 400 MILLIGRAM(S): at 04:27

## 2022-06-20 RX ADMIN — Medication 10 MILLIGRAM(S): at 12:55

## 2022-06-20 RX ADMIN — Medication 30 MILLIGRAM(S): at 09:20

## 2022-06-20 RX ADMIN — GABAPENTIN 200 MILLIGRAM(S): 400 CAPSULE ORAL at 22:19

## 2022-06-20 RX ADMIN — FAMOTIDINE 20 MILLIGRAM(S): 10 INJECTION INTRAVENOUS at 01:44

## 2022-06-20 RX ADMIN — Medication 30 MILLIGRAM(S): at 01:44

## 2022-06-20 RX ADMIN — Medication 975 MILLIGRAM(S): at 17:53

## 2022-06-20 RX ADMIN — ERTAPENEM SODIUM 120 MILLIGRAM(S): 1 INJECTION, POWDER, LYOPHILIZED, FOR SOLUTION INTRAMUSCULAR; INTRAVENOUS at 21:01

## 2022-06-20 RX ADMIN — Medication 100 GRAM(S): at 01:44

## 2022-06-20 RX ADMIN — Medication 30 MILLIGRAM(S): at 20:26

## 2022-06-20 RX ADMIN — GABAPENTIN 200 MILLIGRAM(S): 400 CAPSULE ORAL at 14:41

## 2022-06-20 RX ADMIN — BENZOCAINE AND MENTHOL 1 LOZENGE: 5; 1 LIQUID ORAL at 14:41

## 2022-06-20 RX ADMIN — Medication 30 MILLIGRAM(S): at 20:56

## 2022-06-20 RX ADMIN — Medication 30 MILLIGRAM(S): at 15:20

## 2022-06-20 RX ADMIN — Medication 10 MILLIGRAM(S): at 04:28

## 2022-06-20 RX ADMIN — Medication 500 MILLIGRAM(S): at 12:55

## 2022-06-20 RX ADMIN — Medication 975 MILLIGRAM(S): at 17:14

## 2022-06-20 NOTE — PROVIDER CONTACT NOTE (OTHER) - SITUATION
C/S at 21.5 wks, fetal demise. pt has an elavated temp of 100.4F and HR of 117. pt has increasing abdominal pain
C/S at 21.5 wks, fetal demise. pt reports she just vomited. Pt just came back from X-ray

## 2022-06-20 NOTE — PROGRESS NOTE ADULT - ATTENDING COMMENTS
Obstetrical  8am-6pm:  Patient seen and examined by me. Agree with above resident note. Incision clean, dry and intact. To recheck H/H for stability. (+) flatus after Xray dx of post operative ileus. Continue to observe.  Hema BANKS Obstetrical  8am-6pm:  Patient seen and examined by me. Agree with above resident note. Incision clean, dry and intact. To recheck H/H for stability. (+) flatus after Xray dx of post operative ileus. Abdomen relatively flat with minimal tenderness. Continues to pass flatus freely and is "very hungry". Regular diet to be ordered (recommended to patient to start out lighter).  Hema BANKS

## 2022-06-20 NOTE — PROVIDER CONTACT NOTE (OTHER) - ASSESSMENT
16oz cc emesis output.  Yellow and thick in content. pt reports she is now passing gas, bowel sounds active, abdomen less tender on palpation. /69, HR 94, pulse ox 97%, temp 99.5F, resp 18. pt reports feeling better at this time. urine output adequate. movement occurred prior, pt transported to X Ray. pt is NPO

## 2022-06-20 NOTE — CHART NOTE - NSCHARTNOTEFT_GEN_A_CORE
R3 Chart note    Patient seen at bedside and reports feeling much improved from previous. Denies fevers, headaches, CP, SOB, abdominal pain, N/V and edema with no acute complaints.     Vital Signs Last 24 Hrs  T(C): 38 (2022 20:46), Max: 38 (2022 20:46)  T(F): 100.4 (2022 20:46), Max: 100.4 (2022 20:46)  HR: 117 (2022 20:46) (89 - 117)  BP: 103/69 (2022 20:46) (95/60 - 110/74)  BP(mean): --  RR: 18 (2022 20:46) (18 - 20)  SpO2: 96% (:46) (96% - 99%)    Gen NAD  CV Regular  Pulm comfortable on RA  Abd soft nontender, moderately distended, hypoactive bowel sounds  Ext nontender b/l, SCDs in place               7.9    28.32 )-----------( 250      (  @ 21:01 )             23.8                8.1    28.26 )-----------( 250      (  @ 16:50 )             24.4                6.9    19.20 )-----------( 265      (  @ 07:47 )             21.1       22 @ 20:58    137  |  106  |  6<L>             --------------------------< 89     3.6  |  19<L>  | 0.47<L>    eGFR AA: --  eGFR N-AA: --    Calcium: 7.8<L>  Phosphorus: 3.1  Magnesium: 1.5<L>    AST: 31    ALT: 9<L>  AlkPhos: 83  Protein: 4.8<L>  Albumin: 2.3<L>  TBili: 0.4  D-Bili: --  22 @ 07:47    135  |  104  |  6<L>             --------------------------< 97     4.2  |  20<L>  | 0.63    eGFR AA: --  eGFR N-AA: --    Calcium: 7.7<L>  Phosphorus: 3.2  Magnesium: 1.5<L>    AST: 33    ALT: 9<L>  AlkPhos: 77  Protein: 4.4<L>  Albumin: 2.3<L>  TBili: 0.2  D-Bili: --      A/P: 38yo  POD#1 s/p pCCS & myomectomy (EBL 4000) @21w5d under GETA for hemodynamic instability 2/2 hemorrhagic complete placenta previa / PPROM s/p  4uPRBC + 2 FFP + 1 Plt intraop and SICU admission () for hemodynamic instability. Additional 1u pRBC given today . WBC uptrending 19.2->28.26. Patient evaluated at the bedside for nausea/vomiting, and abdominal pain concern for postop ileus and endometritis.      Plan  - STAT BCx x2, UCx  - NPO, LR@125  - Urgent Abdominal Xray   - C/w Invanz for presumed endometritis   - Zofran PRN for continued nausea/vomiting  - c/w IV Tylenol and Toradol, and Oxy PRN  - Replete electrolytes     seen and d/w Dr. Jamshid Patton PGY3 R3 Chart note    Patient seen at bedside and reports feeling much improved from previous. Denies fevers, headaches, CP, SOB, abdominal pain, N/V and edema with no acute complaints.     Vital Signs Last 24 Hrs  T(C): 38 (2022 20:46), Max: 38 (2022 20:46)  T(F): 100.4 (2022 20:46), Max: 100.4 (2022 20:46)  HR: 117 (2022 20:46) (89 - 117)  BP: 103/69 (2022 20:46) (95/60 - 110/74)  BP(mean): --  RR: 18 (2022 20:46) (18 - 20)  SpO2: 96% (:46) (96% - 99%)    Gen NAD  CV Regular  Pulm comfortable on RA  Abd soft nontender, moderately distended, hypoactive bowel sounds  Ext nontender b/l, SCDs in place               7.9    28.32 )-----------( 250      (  @ 21:01 )             23.8                8.1    28.26 )-----------( 250      (  @ 16:50 )             24.4                6.9    19.20 )-----------( 265      (  @ 07:47 )             21.1       22 @ 20:58    137  |  106  |  6<L>             --------------------------< 89     3.6  |  19<L>  | 0.47<L>    eGFR AA: --  eGFR N-AA: --    Calcium: 7.8<L>  Phosphorus: 3.1  Magnesium: 1.5<L>    AST: 31    ALT: 9<L>  AlkPhos: 83  Protein: 4.8<L>  Albumin: 2.3<L>  TBili: 0.4  D-Bili: --  22 @ 07:47    135  |  104  |  6<L>             --------------------------< 97     4.2  |  20<L>  | 0.63    eGFR AA: --  eGFR N-AA: --    Calcium: 7.7<L>  Phosphorus: 3.2  Magnesium: 1.5<L>    AST: 33    ALT: 9<L>  AlkPhos: 77  Protein: 4.4<L>  Albumin: 2.3<L>  TBili: 0.2  D-Bili: --      A/P: 38yo  POD#1 s/p pCCS & myomectomy (EBL 4000) @21w5d under GETA for hemodynamic instability 2/2 hemorrhagic complete placenta previa / PPROM s/p  4uPRBC + 2 FFP + 1 Plt intraop and SICU admission () for hemodynamic instability. Additional 1u pRBC given today . WBC uptrending 19.2->28.26. Patient evaluated at the bedside for nausea/vomiting, and abdominal pain concern for postop ileus and endometritis.      Plan  - f/u BCx x2, UCx  - NPO, LR@125  - Urgent Abdominal Xray   - C/w Invanz for presumed endometritis   - Zofran PRN for continued nausea/vomiting  - c/w IV Tylenol and Toradol, and Oxy PRN  - Replete electrolytes     seen and d/w Dr. Jamshid Patton PGY3

## 2022-06-20 NOTE — CHART NOTE - NSCHARTNOTEFT_GEN_A_CORE
R3 Chart note    Preliminary Xray reports likely postop ileus. Per patient nurse she is now passing flatus and feeling well coming back from her Xray.     < from: Xray Abdomen 2 Views (06.20.22 @ 01:03) >    IMPRESSION:  Prelim:  Dilated loops of large bowel with air-fluid levels on upright film   possibly secondary to postoperative ileus.    ******PRELIMINARY REPORT******      < end of copied text > R3 Chart note    Preliminary Xray reports likely postop ileus as below. Per patient nurse she is now passing flatus, had 16cc pale yellow spit up, and feeling well coming back from her Xray.     Vital Signs Last 24 Hrs  T(C): 38 (2022 20:46), Max: 38 (2022 20:46)  T(F): 100.4 (2022 20:46), Max: 100.4 (2022 20:46)  HR: 117 (2022 20:46) (89 - 117)  BP: 103/69 (2022 20:46) (95/60 - 110/74)  BP(mean): --  RR: 18 (2022 20:46) (18 - 20)  SpO2: 96% (:46) (96% - 99%)    < from: Xray Abdomen 2 Views (22 @ 01:03) >    IMPRESSION:  Prelim:  Dilated loops of large bowel with air-fluid levels on upright film   possibly secondary to postoperative ileus.    ******PRELIMINARY REPORT******      A/P: 38yo  POD#2 s/p pCCS & myomectomy (EBL 4000) @21w5d under GETA for hemodynamic instability 2/2 hemorrhagic complete placenta previa / PPROM s/p  4uPRBC + 2 FFP + 1 Plt intraop and SICU admission () for hemodynamic instability. Additional 1u pRBC given today . WBC uptrending 19.2->28.26. Patient with nausea/vomiting, and abdominal pain concern for postop ileus and endometritis now passing flatus.     - NPO, IVF@125  - eval for advancing diet in AM   - AM CBC, BMP/Mg/P  - Mg repletion   - C/w Invanz     d/w Dr. Jamshid Patton PGY3

## 2022-06-21 ENCOUNTER — TRANSCRIPTION ENCOUNTER (OUTPATIENT)
Age: 37
End: 2022-06-21

## 2022-06-21 VITALS
DIASTOLIC BLOOD PRESSURE: 71 MMHG | HEART RATE: 65 BPM | OXYGEN SATURATION: 98 % | SYSTOLIC BLOOD PRESSURE: 107 MMHG | TEMPERATURE: 98 F | RESPIRATION RATE: 18 BRPM

## 2022-06-21 LAB
ALBUMIN SERPL ELPH-MCNC: 2.2 G/DL — LOW (ref 3.3–5)
ALP SERPL-CCNC: 102 U/L — SIGNIFICANT CHANGE UP (ref 40–120)
ALT FLD-CCNC: 23 U/L — SIGNIFICANT CHANGE UP (ref 10–45)
ANION GAP SERPL CALC-SCNC: 10 MMOL/L — SIGNIFICANT CHANGE UP (ref 5–17)
APTT BLD: 28.3 SEC — SIGNIFICANT CHANGE UP (ref 27.5–35.5)
AST SERPL-CCNC: 43 U/L — HIGH (ref 10–40)
BASOPHILS # BLD AUTO: 0.06 K/UL — SIGNIFICANT CHANGE UP (ref 0–0.2)
BASOPHILS NFR BLD AUTO: 0.3 % — SIGNIFICANT CHANGE UP (ref 0–2)
BILIRUB SERPL-MCNC: 0.2 MG/DL — SIGNIFICANT CHANGE UP (ref 0.2–1.2)
BUN SERPL-MCNC: 8 MG/DL — SIGNIFICANT CHANGE UP (ref 7–23)
CALCIUM SERPL-MCNC: 7.9 MG/DL — LOW (ref 8.4–10.5)
CHLORIDE SERPL-SCNC: 107 MMOL/L — SIGNIFICANT CHANGE UP (ref 96–108)
CO2 SERPL-SCNC: 20 MMOL/L — LOW (ref 22–31)
CREAT SERPL-MCNC: 0.55 MG/DL — SIGNIFICANT CHANGE UP (ref 0.5–1.3)
EGFR: 120 ML/MIN/1.73M2 — SIGNIFICANT CHANGE UP
EOSINOPHIL # BLD AUTO: 0.22 K/UL — SIGNIFICANT CHANGE UP (ref 0–0.5)
EOSINOPHIL NFR BLD AUTO: 1.2 % — SIGNIFICANT CHANGE UP (ref 0–6)
FIBRINOGEN PPP-MCNC: 916 MG/DL — HIGH (ref 330–520)
GLUCOSE SERPL-MCNC: 89 MG/DL — SIGNIFICANT CHANGE UP (ref 70–99)
HCT VFR BLD CALC: 22.2 % — LOW (ref 34.5–45)
HGB BLD-MCNC: 7.2 G/DL — LOW (ref 11.5–15.5)
IMM GRANULOCYTES NFR BLD AUTO: 1.7 % — HIGH (ref 0–1.5)
INR BLD: 1.13 RATIO — SIGNIFICANT CHANGE UP (ref 0.88–1.16)
LYMPHOCYTES # BLD AUTO: 19.2 % — SIGNIFICANT CHANGE UP (ref 13–44)
LYMPHOCYTES # BLD AUTO: 3.48 K/UL — HIGH (ref 1–3.3)
MAGNESIUM SERPL-MCNC: 1.9 MG/DL — SIGNIFICANT CHANGE UP (ref 1.6–2.6)
MCHC RBC-ENTMCNC: 28.1 PG — SIGNIFICANT CHANGE UP (ref 27–34)
MCHC RBC-ENTMCNC: 32.4 GM/DL — SIGNIFICANT CHANGE UP (ref 32–36)
MCV RBC AUTO: 86.7 FL — SIGNIFICANT CHANGE UP (ref 80–100)
MONOCYTES # BLD AUTO: 0.96 K/UL — HIGH (ref 0–0.9)
MONOCYTES NFR BLD AUTO: 5.3 % — SIGNIFICANT CHANGE UP (ref 2–14)
NEUTROPHILS # BLD AUTO: 13.09 K/UL — HIGH (ref 1.8–7.4)
NEUTROPHILS NFR BLD AUTO: 72.3 % — SIGNIFICANT CHANGE UP (ref 43–77)
NRBC # BLD: 0 /100 WBCS — SIGNIFICANT CHANGE UP (ref 0–0)
PHOSPHATE SERPL-MCNC: 2.4 MG/DL — LOW (ref 2.5–4.5)
PLATELET # BLD AUTO: 311 K/UL — SIGNIFICANT CHANGE UP (ref 150–400)
POTASSIUM SERPL-MCNC: 4.3 MMOL/L — SIGNIFICANT CHANGE UP (ref 3.5–5.3)
POTASSIUM SERPL-SCNC: 4.3 MMOL/L — SIGNIFICANT CHANGE UP (ref 3.5–5.3)
PROT SERPL-MCNC: 5.4 G/DL — LOW (ref 6–8.3)
PROTHROM AB SERPL-ACNC: 13 SEC — SIGNIFICANT CHANGE UP (ref 10.5–13.4)
RBC # BLD: 2.56 M/UL — LOW (ref 3.8–5.2)
RBC # FLD: 21.6 % — HIGH (ref 10.3–14.5)
SODIUM SERPL-SCNC: 137 MMOL/L — SIGNIFICANT CHANGE UP (ref 135–145)
WBC # BLD: 18.12 K/UL — HIGH (ref 3.8–10.5)
WBC # FLD AUTO: 18.12 K/UL — HIGH (ref 3.8–10.5)

## 2022-06-21 PROCEDURE — 74019 RADEX ABDOMEN 2 VIEWS: CPT

## 2022-06-21 PROCEDURE — 83735 ASSAY OF MAGNESIUM: CPT

## 2022-06-21 PROCEDURE — 86780 TREPONEMA PALLIDUM: CPT

## 2022-06-21 PROCEDURE — P9100: CPT

## 2022-06-21 PROCEDURE — 84295 ASSAY OF SERUM SODIUM: CPT

## 2022-06-21 PROCEDURE — 87040 BLOOD CULTURE FOR BACTERIA: CPT

## 2022-06-21 PROCEDURE — C9399: CPT

## 2022-06-21 PROCEDURE — 74018 RADEX ABDOMEN 1 VIEW: CPT

## 2022-06-21 PROCEDURE — P9040: CPT

## 2022-06-21 PROCEDURE — 85460 HEMOGLOBIN FETAL: CPT

## 2022-06-21 PROCEDURE — 81001 URINALYSIS AUTO W/SCOPE: CPT

## 2022-06-21 PROCEDURE — 36415 COLL VENOUS BLD VENIPUNCTURE: CPT

## 2022-06-21 PROCEDURE — 85018 HEMOGLOBIN: CPT

## 2022-06-21 PROCEDURE — 84100 ASSAY OF PHOSPHORUS: CPT

## 2022-06-21 PROCEDURE — 80053 COMPREHEN METABOLIC PANEL: CPT

## 2022-06-21 PROCEDURE — 97162 PT EVAL MOD COMPLEX 30 MIN: CPT

## 2022-06-21 PROCEDURE — 36430 TRANSFUSION BLD/BLD COMPNT: CPT

## 2022-06-21 PROCEDURE — C1889: CPT

## 2022-06-21 PROCEDURE — 82947 ASSAY GLUCOSE BLOOD QUANT: CPT

## 2022-06-21 PROCEDURE — 85396 CLOTTING ASSAY WHOLE BLOOD: CPT

## 2022-06-21 PROCEDURE — 86985 SPLIT BLOOD OR PRODUCTS: CPT

## 2022-06-21 PROCEDURE — 87086 URINE CULTURE/COLONY COUNT: CPT

## 2022-06-21 PROCEDURE — 86870 RBC ANTIBODY IDENTIFICATION: CPT

## 2022-06-21 PROCEDURE — 85014 HEMATOCRIT: CPT

## 2022-06-21 PROCEDURE — 86850 RBC ANTIBODY SCREEN: CPT

## 2022-06-21 PROCEDURE — 59025 FETAL NON-STRESS TEST: CPT

## 2022-06-21 PROCEDURE — 71045 X-RAY EXAM CHEST 1 VIEW: CPT

## 2022-06-21 PROCEDURE — 86923 COMPATIBILITY TEST ELECTRIC: CPT

## 2022-06-21 PROCEDURE — 85384 FIBRINOGEN ACTIVITY: CPT

## 2022-06-21 PROCEDURE — 83605 ASSAY OF LACTIC ACID: CPT

## 2022-06-21 PROCEDURE — 97116 GAIT TRAINING THERAPY: CPT

## 2022-06-21 PROCEDURE — 85027 COMPLETE CBC AUTOMATED: CPT

## 2022-06-21 PROCEDURE — 88305 TISSUE EXAM BY PATHOLOGIST: CPT

## 2022-06-21 PROCEDURE — 59050 FETAL MONITOR W/REPORT: CPT

## 2022-06-21 PROCEDURE — 85730 THROMBOPLASTIN TIME PARTIAL: CPT

## 2022-06-21 PROCEDURE — 82803 BLOOD GASES ANY COMBINATION: CPT

## 2022-06-21 PROCEDURE — 82330 ASSAY OF CALCIUM: CPT

## 2022-06-21 PROCEDURE — 97110 THERAPEUTIC EXERCISES: CPT

## 2022-06-21 PROCEDURE — 86900 BLOOD TYPING SEROLOGIC ABO: CPT

## 2022-06-21 PROCEDURE — G0463: CPT

## 2022-06-21 PROCEDURE — 86901 BLOOD TYPING SEROLOGIC RH(D): CPT

## 2022-06-21 PROCEDURE — P9059: CPT

## 2022-06-21 PROCEDURE — 80048 BASIC METABOLIC PNL TOTAL CA: CPT

## 2022-06-21 PROCEDURE — 85025 COMPLETE CBC W/AUTO DIFF WBC: CPT

## 2022-06-21 PROCEDURE — 80076 HEPATIC FUNCTION PANEL: CPT

## 2022-06-21 PROCEDURE — P9037: CPT

## 2022-06-21 PROCEDURE — 84132 ASSAY OF SERUM POTASSIUM: CPT

## 2022-06-21 PROCEDURE — P9016: CPT

## 2022-06-21 PROCEDURE — 82435 ASSAY OF BLOOD CHLORIDE: CPT

## 2022-06-21 PROCEDURE — 85610 PROTHROMBIN TIME: CPT

## 2022-06-21 PROCEDURE — P9011: CPT

## 2022-06-21 RX ORDER — IBUPROFEN 200 MG
600 TABLET ORAL EVERY 6 HOURS
Refills: 0 | Status: DISCONTINUED | OUTPATIENT
Start: 2022-06-21 | End: 2022-06-21

## 2022-06-21 RX ORDER — IBUPROFEN 200 MG
1 TABLET ORAL
Qty: 0 | Refills: 0 | DISCHARGE
Start: 2022-06-21

## 2022-06-21 RX ORDER — ACETAMINOPHEN 500 MG
3 TABLET ORAL
Qty: 0 | Refills: 0 | DISCHARGE
Start: 2022-06-21

## 2022-06-21 RX ADMIN — Medication 975 MILLIGRAM(S): at 13:00

## 2022-06-21 RX ADMIN — Medication 600 MILLIGRAM(S): at 14:50

## 2022-06-21 RX ADMIN — Medication 975 MILLIGRAM(S): at 05:35

## 2022-06-21 RX ADMIN — Medication 600 MILLIGRAM(S): at 15:30

## 2022-06-21 RX ADMIN — Medication 975 MILLIGRAM(S): at 18:22

## 2022-06-21 RX ADMIN — GABAPENTIN 200 MILLIGRAM(S): 400 CAPSULE ORAL at 14:50

## 2022-06-21 RX ADMIN — Medication 975 MILLIGRAM(S): at 01:03

## 2022-06-21 RX ADMIN — HEPARIN SODIUM 5000 UNIT(S): 5000 INJECTION INTRAVENOUS; SUBCUTANEOUS at 10:42

## 2022-06-21 RX ADMIN — FAMOTIDINE 20 MILLIGRAM(S): 10 INJECTION INTRAVENOUS at 12:28

## 2022-06-21 RX ADMIN — Medication 975 MILLIGRAM(S): at 12:28

## 2022-06-21 RX ADMIN — Medication 500 MILLIGRAM(S): at 12:27

## 2022-06-21 RX ADMIN — GABAPENTIN 200 MILLIGRAM(S): 400 CAPSULE ORAL at 05:35

## 2022-06-21 RX ADMIN — Medication 325 MILLIGRAM(S): at 12:27

## 2022-06-21 RX ADMIN — Medication 600 MILLIGRAM(S): at 08:32

## 2022-06-21 RX ADMIN — Medication 600 MILLIGRAM(S): at 09:05

## 2022-06-21 RX ADMIN — Medication 975 MILLIGRAM(S): at 06:05

## 2022-06-21 RX ADMIN — Medication 975 MILLIGRAM(S): at 00:33

## 2022-06-21 RX ADMIN — FAMOTIDINE 20 MILLIGRAM(S): 10 INJECTION INTRAVENOUS at 00:33

## 2022-06-21 NOTE — DISCHARGE NOTE OB - NS MD DC FALL RISK RISK
For information on Fall & Injury Prevention, visit: [email or web address] OR  [email or web address] OR  https://www.cdc.gov/steadi/patient.html

## 2022-06-21 NOTE — PROGRESS NOTE ADULT - ATTENDING COMMENTS
Obstetrical  8am-6pm:  Patient seen and examined by me. Agree with above resident note. Incision clean, dry and intact.  Hgb 7.9->6.6->8.3->7.2; WBC 28.32->23.86->27.25->18.12 no longer a left shift. ALT 43/AST 23.  Patient feels well and is still passing gas without difficulty, eating well, asking to be discharged.  Hema BANKS

## 2022-06-21 NOTE — DISCHARGE NOTE OB - PLAN OF CARE
Make your follow-up appointment with your doctor as ordered. Call the clinic for your follow up appointment in 4-6 weeks. No heavy lifting, driving, or strenuous activity for 6 weeks. Nothing per vagina such as tampons, intercourse, douches or tub baths for 6 weeks or until you see your doctor. Call your doctor with any signs and symptoms of infection such as fever, chills, nausea or vomiting. Call your doctor if you’re unable to tolerate food, increase in vaginal bleeding, or have difficulty urinating. Call your doctor if you have pain that is not relieved by your prescribed medications. Notify your doctor with any other concerns. Call [phone] if you have any of these concerns in the next 6 weeks.

## 2022-06-21 NOTE — DISCHARGE NOTE OB - HOSPITAL COURSE
Pt presented to the hospital with heavy vaginal bleeding at 21.5weeks in setting of complete placenta previa. Pt transfused 2u pRBCs upon arrival to hospital. Initially hemodynamically stable, soon after began having heavy bleeding again and became hemodynamically unstable. MTP called. Pt received 2 more units of pRBCS, 2FFP, & 1 plts. Pt consented for classical c/s, taken to the OR, placed under general anesthesia. Classical Hysterotomy performed, fetus, placenta, 2 small fibroids (at hysterotomy site) removed. Pt sent ot SICU for monitoring postoperatively. Pt downgraded to postpartum floor. On POD#1, pt w/ low H/H, received 1 additional unit of pRBCs. Pt also w/ worsening abdominal pain, fever,  N/V. Pt noted to have ileus on imaging. Pt given invanz for presumed endometritis in setting of abd pain, fever, & elevated WBC. On POD#2 pt's pain improved, passing gas, afebrile. On POD#3 pt meeting appropriate postoperative milestones, & pt discharged home in stable condition.

## 2022-06-21 NOTE — DISCHARGE NOTE OB - CARE PROVIDER_API CALL
[facility],   [address] [place] [id number]  Phone: [phone]  Fax: (   )    -  Follow Up Time:

## 2022-06-21 NOTE — DISCHARGE NOTE OB - PATIENT PORTAL LINK FT
You can access the FollowMyHealth Patient Portal offered by [facility] by registering at the following website: [email or web address]. By joining [employer]’s FollowMyHealth portal, you will also be able to view your health information using other applications (apps) compatible with our system.

## 2022-06-21 NOTE — PROGRESS NOTE ADULT - SUBJECTIVE AND OBJECTIVE BOX
OB Progress Note: LTCS, POD#2    S: 36yo POD#2 s/p pCCS at 21w5d 2/2 complete previa bleeding. Overnight patient was having nausea, vomiting, abdominal pain with distension. She was made NPO and abdominal Xray preliminary report concerning for postoperative ileus. Additionally, she was febrile to 38 at 8:46pm. BCx and UCx were drawn and she was started on Invanz 2/2 presumed endometritis.     Pain is well controlled at this time. She is NPO and now passing flatus. She has a hidalgo in place, and ambulating without difficulty. Endorses light vaginal bleeding, soaking <1 pad/hr. Denies CP/SOB. Denies lightheadedness/dizziness. Denies N/V.    O:  Vitals:  Vital Signs Last 24 Hrs  T(C): 37.5 (20 Jun 2022 01:00), Max: 38 (19 Jun 2022 20:46)  T(F): 99.5 (20 Jun 2022 01:00), Max: 100.4 (19 Jun 2022 20:46)  HR: 94 (20 Jun 2022 01:00) (89 - 117)  BP: 105/69 (20 Jun 2022 01:00) (95/60 - 110/74)  BP(mean): --  RR: 20 (20 Jun 2022 01:00) (18 - 20)  SpO2: 97% (20 Jun 2022 01:00) (96% - 99%)    MEDICATIONS  (STANDING):  acetaminophen   IVPB .. 1000 milliGRAM(s) IV Intermittent once  benzocaine 15 mG/menthol 3.6 mG Lozenge 1 Lozenge Oral three times a day  chlorhexidine 2% Cloths 1 Application(s) Topical <User Schedule>  ertapenem  IVPB 1000 milliGRAM(s) IV Intermittent every 24 hours  famotidine Injectable 20 milliGRAM(s) IV Push two times a day  gabapentin 200 milliGRAM(s) Oral every 8 hours  heparin   Injectable 5000 Unit(s) SubCutaneous every 12 hours  ketorolac   Injectable 30 milliGRAM(s) IV Push every 6 hours  lactated ringers. 1000 milliLiter(s) (125 mL/Hr) IV Continuous <Continuous>  metoclopramide Injectable 10 milliGRAM(s) IV Push every 8 hours      MEDICATIONS  (PRN):  HYDROmorphone  Injectable 1 milliGRAM(s) IV Push every 6 hours PRN Severe Pain (7 - 10)  naloxone Injectable 0.1 milliGRAM(s) IV Push every 3 minutes PRN For ANY of the following changes in patient status:  A. RR LESS THAN 10 breaths per minute, B. Oxygen saturation LESS THAN 90%, C. Sedation score of 6  ondansetron Injectable 4 milliGRAM(s) IV Push every 6 hours PRN Nausea  oxyCODONE    IR 5 milliGRAM(s) Oral every 6 hours PRN Severe Pain (7 - 10)      Labs:  Blood type: B Negative  Rubella IgG: RPR: Negative                          7.9<L>   28.32<H> >-----------< 250    ( 06-19 @ 21:01 )             23.8<L>                        8.1<L>   28.26<H> >-----------< 250    ( 06-19 @ 16:50 )             24.4<L>                        6.9<LL>   19.20<H> >-----------< 265    ( 06-19 @ 07:47 )             21.1<L>                        7.3<L>   18.86<H> >-----------< 277    ( 06-18 @ 18:32 )             21.6<L>                        8.3<L>   21.09<H> >-----------< 322    ( 06-18 @ 12:12 )             24.2<L>                        8.1<L>   17.42<H> >-----------< 309    ( 06-18 @ 05:16 )             24.5<L>                        8.8<L>   14.86<H> >-----------< 496<H>    ( 06-18 @ 00:51 )             28.7<L>    06-19-22 @ 20:58      137  |  106  |  6<L>  ----------------------------<  89  3.6   |  19<L>  |  0.47<L>    06-19-22 @ 07:47      135  |  104  |  6<L>  ----------------------------<  97  4.2   |  20<L>  |  0.63    06-18-22 @ 12:12      132<L>  |  103  |  5<L>  ----------------------------<  125<H>  4.4   |  22  |  0.46<L>    06-18-22 @ 05:16      134<L>  |  106  |  7   ----------------------------<  182<H>  3.9   |  17<L>  |  0.41<L>    06-18-22 @ 00:51      135  |  102  |  10  ----------------------------<  100<H>  3.8   |  20<L>  |  0.55        Ca    7.8<L>      19 Jun 2022 20:58  Ca    7.7<L>      19 Jun 2022 07:47  Ca    8.2<L>      18 Jun 2022 12:12  Ca    8.7      18 Jun 2022 05:16  Ca    10.2      18 Jun 2022 00:51  Phos  3.1     06-19  Phos  3.2     06-19  Phos  3.9     06-18  Phos  4.1     06-18  Mg     1.5<L>     06-19  Mg     1.5<L>     06-19  Mg     1.7     06-18  Mg     1.3<L>     06-18    TPro  4.8<L>  /  Alb  2.3<L>  /  TBili  0.4  /  DBili  x   /  AST  31  /  ALT  9<L>  /  AlkPhos  83  06-19-22 @ 20:58  TPro  4.4<L>  /  Alb  2.3<L>  /  TBili  0.2  /  DBili  x   /  AST  33  /  ALT  9<L>  /  AlkPhos  77  06-19-22 @ 07:47  TPro  5.1<L>  /  Alb  2.7<L>  /  TBili  0.3  /  DBili  x   /  AST  32  /  ALT  9<L>  /  AlkPhos  64  06-18-22 @ 12:12  TPro  4.6<L>  /  Alb  2.3<L>  /  TBili  0.5  /  DBili  <0.1  /  AST  16  /  ALT  6<L>  /  AlkPhos  65  06-18-22 @ 05:16  TPro  6.6  /  Alb  3.2<L>  /  TBili  <0.1<L>  /  DBili  x   /  AST  17  /  ALT  10  /  AlkPhos  94  06-18-22 @ 00:51          PE:  General: NAD  Heart: extremities well-perfused  Lungs: breathing normally  Abdomen: Soft, appropriately tender, moderately distended with hypoactive bowel sounds present, incision c/d/i, fundus firm  Extremities: No erythema, no pitting edema    
Day 1 of Anesthesia Pain Management Service    SUBJECTIVE: Patient is doing well with IV PCA    Pain Scale Score:	[X] Refer to charted pain scores    THERAPY:    [ ] IV PCA Morphine		[ ] 5 mg/mL	[ ] 1 mg/mL  [X] IV PCA Hydromorphone	[ ] 5 mg/mL	[X] 1 mg/mL  [ ] IV PCA Fentanyl		[ ] 50 micrograms/mL    Demand dose: 0.2 mg     Lockout: 6 minutes   Continuous Rate: 0 mg/hr  4 Hour Limit: 4 mg    MEDICATIONS  (STANDING):  benzocaine 15 mG/menthol 3.6 mG Lozenge 1 Lozenge Oral three times a day  chlorhexidine 2% Cloths 1 Application(s) Topical <User Schedule>  famotidine    Tablet 20 milliGRAM(s) Oral daily  HYDROmorphone PCA (1 mG/mL) 30 milliLiter(s) PCA Continuous PCA Continuous  ketorolac   Injectable 15 milliGRAM(s) IV Push every 6 hours  lactated ringers. 1000 milliLiter(s) (20 mL/Hr) IV Continuous <Continuous>    MEDICATIONS  (PRN):  HYDROmorphone PCA (1 mG/mL) Rescue Clinician Bolus 0.5 milliGRAM(s) IV Push every 15 minutes PRN for Pain Scale GREATER THAN 6  naloxone Injectable 0.1 milliGRAM(s) IV Push every 3 minutes PRN For ANY of the following changes in patient status:  A. RR LESS THAN 10 breaths per minute, B. Oxygen saturation LESS THAN 90%, C. Sedation score of 6  ondansetron Injectable 4 milliGRAM(s) IV Push every 6 hours PRN Nausea      OBJECTIVE:    Sedation Score:	[ X] Alert	[ ] Drowsy 	[ ] Arousable	[ ] Asleep	[ ] Unresponsive    Side Effects:	[ ] None	[X ] Nausea	[ ] Vomiting	[ ] Pruritus  		[ ] Other:    Vital Signs Last 24 Hrs  T(C): 36.9 (19 Jun 2022 05:45), Max: 37.4 (19 Jun 2022 01:00)  T(F): 98.5 (19 Jun 2022 05:45), Max: 99.3 (19 Jun 2022 01:00)  HR: 100 (19 Jun 2022 09:09) (90 - 102)  BP: 103/67 (19 Jun 2022 09:07) (100/60 - 116/69)  BP(mean): 85 (18 Jun 2022 15:00) (78 - 87)  RR: 20 (19 Jun 2022 09:09) (15 - 20)  SpO2: 98% (19 Jun 2022 09:09) (96% - 99%)    ASSESSMENT/ PLAN    Therapy to  be:               [X] Continued   [ ] Discontinued   [ ] Changed to PRN Analgesics    Documentation and Verification of current medications:   [X] Done	[ ] Not done, not eligible    Comments:  Patient seen in conjunction with OB team.  Reports nausea and poor pain control.  Patient to receive Zofran this AM and transition to PO pain medications for better long acting coverage.  Encouraged use of non-opioid adjuvants as well.
R3 Antepartum Note,       Patient seen and examined at bedside, no acute overnight events. No acute complaints. Denies dizziness, lightheadedness, HA, epigastric pain, blurred vision, CP, palpitations SOB, N/V, fevers, and chills.    Vital Signs Last 24 Hours  T(C): 37.4 (06-19-22 @ 01:00), Max: 37.4 (06-19-22 @ 01:00)  HR: 95 (06-19-22 @ 01:00) (90 - 106)  BP: 100/60 (06-19-22 @ 01:00) (100/60 - 121/57)  RR: 18 (06-19-22 @ 01:00) (13 - 22)  SpO2: 96% (06-19-22 @ 01:00) (96% - 100%)    CAPILLARY BLOOD GLUCOSE          Physical Exam:  General: NAD  Abdomen: Fundus firm, appropriate post operative tenderness on exam   Incision: low transverse c/d/i  Ext: No pain or swelling    Labs:             7.3    18.86 )-----------( 277      ( 06-18 @ 18:32 )             21.6     06-18 @ 12:12    132  |  103  |  5   ----------------------------<  125  4.4   |  22  |  0.46    Ca    8.2      06-18 @ 12:12  Phos  3.9     06-18 @ 12:12  Mg     1.7     06-18 @ 12:12    TPro  5.1  /  Alb  2.7  /  TBili  0.3  /  DBili  x   /  AST  32  /  ALT  9   /  AlkPhos  64  06-18 @ 12:12    PT/INR - ( 06-18 @ 12:12 )   PT: 13.8 sec;   INR: 1.20 ratio    PTT - ( 06-18 @ 12:12 )  PTT:25.3 sec    Uric Acid: (06-18 @ 12:12)  --       Fibrinogen: (06-18 @ 12:12)  447      LDH: (06-18 @ 12:12)  --         MEDICATIONS  (STANDING):  acetaminophen   IVPB .. 1000 milliGRAM(s) IV Intermittent every 6 hours  chlorhexidine 2% Cloths 1 Application(s) Topical <User Schedule>  famotidine    Tablet 20 milliGRAM(s) Oral daily  HYDROmorphone PCA (1 mG/mL) 30 milliLiter(s) PCA Continuous PCA Continuous  ketorolac   Injectable 15 milliGRAM(s) IV Push every 6 hours  lactated ringers. 1000 milliLiter(s) (20 mL/Hr) IV Continuous <Continuous>    MEDICATIONS  (PRN):  HYDROmorphone PCA (1 mG/mL) Rescue Clinician Bolus 0.5 milliGRAM(s) IV Push every 15 minutes PRN for Pain Scale GREATER THAN 6  naloxone Injectable 0.1 milliGRAM(s) IV Push every 3 minutes PRN For ANY of the following changes in patient status:  A. RR LESS THAN 10 breaths per minute, B. Oxygen saturation LESS THAN 90%, C. Sedation score of 6  ondansetron Injectable 4 milliGRAM(s) IV Push every 6 hours PRN Nausea  
MFM Fellow Progress Note      S: Pt w/ moderate pain otherwise resting comfortable. Not yet attempt PO. Not yet ambulate. Voiding via hidalgo      O:  ICU Vital Signs Last 24 Hrs  T(C): 36.7 (18 Jun 2022 08:00), Max: 36.9 (18 Jun 2022 04:50)  T(F): 98 (18 Jun 2022 08:00), Max: 98.4 (18 Jun 2022 04:50)  HR: 94 (18 Jun 2022 10:00) (62 - 118)  BP: 110/73 (18 Jun 2022 10:00) (85/48 - 140/80)  BP(mean): 83 (18 Jun 2022 10:00) (82 - 85)  ABP: 93/81 (18 Jun 2022 07:00) (93/81 - 141/59)  ABP(mean): 85 (18 Jun 2022 07:00) (79 - 94)  RR: 16 (18 Jun 2022 10:00) (13 - 22)  SpO2: 98% (18 Jun 2022 10:00) (97% - 100%)  Gen: tired appearing  abd: approp tender, soft, incision c/d/i  pelvic: scant lochia                            8.1    17.42 )-----------( 309      ( 18 Jun 2022 05:16 )             24.5     
OB Progress Note: LTCS, POD#3    S: 36yo POD#3 s/p pLTCS. Pain is well controlled. She is tolerating a regular diet and passing flatus. She is voiding spontaneously, and ambulating without difficulty. Endorses light vaginal bleeding, soaking <1 pad/hr. Denies CP/SOB. Denies lightheadedness/dizziness. Denies N/V.    O:  Vitals:  Vital Signs Last 24 Hrs  T(C): 37 (21 Jun 2022 00:15), Max: 37 (21 Jun 2022 00:15)  T(F): 98.6 (21 Jun 2022 00:15), Max: 98.6 (21 Jun 2022 00:15)  HR: 99 (21 Jun 2022 00:15) (83 - 99)  BP: 104/68 (21 Jun 2022 00:15) (91/57 - 108/70)  BP(mean): --  RR: 18 (21 Jun 2022 00:15) (18 - 18)  SpO2: 99% (21 Jun 2022 00:15) (96% - 99%)    MEDICATIONS  (STANDING):  acetaminophen     Tablet .. 975 milliGRAM(s) Oral every 6 hours  ascorbic acid 500 milliGRAM(s) Oral daily  benzocaine 15 mG/menthol 3.6 mG Lozenge 1 Lozenge Oral three times a day  chlorhexidine 2% Cloths 1 Application(s) Topical <User Schedule>  famotidine Injectable 20 milliGRAM(s) IV Push two times a day  ferrous    sulfate 325 milliGRAM(s) Oral daily  gabapentin 200 milliGRAM(s) Oral every 8 hours  heparin   Injectable 5000 Unit(s) SubCutaneous every 12 hours  metoclopramide Injectable 10 milliGRAM(s) IV Push every 8 hours      MEDICATIONS  (PRN):  HYDROmorphone  Injectable 1 milliGRAM(s) IV Push every 6 hours PRN Severe Pain (7 - 10)  naloxone Injectable 0.1 milliGRAM(s) IV Push every 3 minutes PRN For ANY of the following changes in patient status:  A. RR LESS THAN 10 breaths per minute, B. Oxygen saturation LESS THAN 90%, C. Sedation score of 6  ondansetron Injectable 4 milliGRAM(s) IV Push every 6 hours PRN Nausea  oxyCODONE    IR 5 milliGRAM(s) Oral every 6 hours PRN Severe Pain (7 - 10)      Labs:  Blood type: B Negative  Rubella IgG: RPR: Negative                          8.3<L>   27.25<H> >-----------< 288    ( 06-20 @ 12:33 )             25.5<L>                        6.6<LL>   23.86<H> >-----------< 248    ( 06-20 @ 07:24 )             20.3<LL>                        7.9<L>   28.32<H> >-----------< 250    ( 06-19 @ 21:01 )             23.8<L>                        8.1<L>   28.26<H> >-----------< 250    ( 06-19 @ 16:50 )             24.4<L>                        6.9<LL>   19.20<H> >-----------< 265    ( 06-19 @ 07:47 )             21.1<L>                        7.3<L>   18.86<H> >-----------< 277    ( 06-18 @ 18:32 )             21.6<L>                        8.3<L>   21.09<H> >-----------< 322    ( 06-18 @ 12:12 )             24.2<L>                        8.1<L>   17.42<H> >-----------< 309    ( 06-18 @ 05:16 )             24.5<L>    06-20-22 @ 07:22      136  |  106  |  6<L>  ----------------------------<  94  3.8   |  20<L>  |  0.45<L>    06-19-22 @ 20:58      137  |  106  |  6<L>  ----------------------------<  89  3.6   |  19<L>  |  0.47<L>    06-19-22 @ 07:47      135  |  104  |  6<L>  ----------------------------<  97  4.2   |  20<L>  |  0.63    06-18-22 @ 12:12      132<L>  |  103  |  5<L>  ----------------------------<  125<H>  4.4   |  22  |  0.46<L>    06-18-22 @ 05:16      134<L>  |  106  |  7   ----------------------------<  182<H>  3.9   |  17<L>  |  0.41<L>        Ca    7.5<L>      20 Jun 2022 07:22  Ca    7.8<L>      19 Jun 2022 20:58  Ca    7.7<L>      19 Jun 2022 07:47  Ca    8.2<L>      18 Jun 2022 12:12  Ca    8.7      18 Jun 2022 05:16  Phos  3.2     06-20  Phos  3.1     06-19  Phos  3.2     06-19  Phos  3.9     06-18  Phos  4.1     06-18  Mg     2.1     06-20  Mg     1.5<L>     06-19  Mg     1.5<L>     06-19  Mg     1.7     06-18  Mg     1.3<L>     06-18    TPro  4.5<L>  /  Alb  1.9<L>  /  TBili  0.2  /  DBili  x   /  AST  25  /  ALT  10  /  AlkPhos  87  06-20-22 @ 07:22  TPro  4.8<L>  /  Alb  2.3<L>  /  TBili  0.4  /  DBili  x   /  AST  31  /  ALT  9<L>  /  AlkPhos  83  06-19-22 @ 20:58  TPro  4.4<L>  /  Alb  2.3<L>  /  TBili  0.2  /  DBili  x   /  AST  33  /  ALT  9<L>  /  AlkPhos  77  06-19-22 @ 07:47  TPro  5.1<L>  /  Alb  2.7<L>  /  TBili  0.3  /  DBili  x   /  AST  32  /  ALT  9<L>  /  AlkPhos  64  06-18-22 @ 12:12  TPro  4.6<L>  /  Alb  2.3<L>  /  TBili  0.5  /  DBili  <0.1  /  AST  16  /  ALT  6<L>  /  AlkPhos  65  06-18-22 @ 05:16          PE:  General: NAD  Heart: extremities well-perfused  Lungs: breathing normally  Abdomen: Soft, appropriately tender, incision c/d/i, steri strips in place, fundus firm  Extremities: No erythema, no pitting edema      < from: Xray Abdomen 2 Views (06.20.22 @ 01:03) >    Distended loops of large bowel with air-fluid levels on upright film   possibly secondary to mild postoperative ileus.      < end of copied text >

## 2022-06-21 NOTE — DISCHARGE NOTE OB - CARE PLAN
1 Principal Discharge DX:	Total placenta previa  Assessment and plan of treatment:	Make your follow-up appointment with your doctor as ordered. Call the clinic for your follow up appointment in 4-6 weeks. No heavy lifting, driving, or strenuous activity for 6 weeks. Nothing per vagina such as tampons, intercourse, douches or tub baths for 6 weeks or until you see your doctor. Call your doctor with any signs and symptoms of infection such as fever, chills, nausea or vomiting. Call your doctor if you’re unable to tolerate food, increase in vaginal bleeding, or have difficulty urinating. Call your doctor if you have pain that is not relieved by your prescribed medications. Notify your doctor with any other concerns. Call [phone] if you have any of these concerns in the next 6 weeks.  Secondary Diagnosis:	Episode of heavy vaginal bleeding  Secondary Diagnosis:	Delivery by classical  section

## 2022-06-21 NOTE — PROGRESS NOTE ADULT - ASSESSMENT
A/P: 36yo  POD#3 s/p pCCS & myomectomy (EBL 4000) @21w5d under GETA for hemodynamic instability 2/2 hemorrhagic complete placenta previa / PPROM s/p  4uPRBC + 2 FFP + 1 Plt intraop and SICU admission () for hemodynamic instability. Additional 1u pRBC given . WBC [id number]->28.26->23->27. Patient with nausea/vomiting, and abdominal pain concern for postop ileus and endometritis now passing flatus.     #Postoperative ileus with N/V, now resolved  - Regular diet  - patient passing flatus   - Abd Xray (): mild postoperative ileus   - encourage ambulation     #Presumed endometritis  - Febrile to 38 at 846p ()  - s/p Invanz (-)  - BCx NGTD (prelim)  - UCx () No Growth    #Acute blood loss anemia   - Elevated lactate, now resolved: 3.2->2.8->2.7->1.5->1.2  - Monitor Hct: 28.7->(4uPRBC)->24.5->24.2->21.6->21.1->1uPRBC->24.4->23.8->20->25    - Monitor Plt: 492->(1Plt)->309->320->277->265->250->250->248->288    - F/u AM CBC/CMP/Coags     #Fetal loss  - SW consult   - Demise paperwork completed     #Postoperative state  - Tylenol, Motrin, Gabapentin, Oxy and Dilaudid prn   - Incision dressing removed   - PT recs: home with home PT    #FEN/GI  - Reg diet  - Voiding spontaneously     #Ppx  - DVT: SCDs, early ambulation,  HSQ     Berta Patton PGY3

## 2022-06-21 NOTE — DISCHARGE NOTE OB - PROVIDER TOKENS
FREE:[LAST:[OB[facility]N Clinic],PHONE:[phone]],FAX:[(   [phone]    -],ADDRESS:[[address] [place] [address]]]

## 2022-06-25 LAB
CULTURE RESULTS: SIGNIFICANT CHANGE UP
CULTURE RESULTS: SIGNIFICANT CHANGE UP
SPECIMEN SOURCE: SIGNIFICANT CHANGE UP
SPECIMEN SOURCE: SIGNIFICANT CHANGE UP

## 2022-10-06 NOTE — ED PROVIDER NOTE - NS_EDPROVIDERDISPOUSERTYPE_ED_A_ED
Summary:   73y  Female      Subjective:   No overnight events reported.       Objective:  MEDICATIONS  (STANDING):  dextrose 5% + sodium chloride 0.45%. 1000 milliLiter(s) (120 mL/Hr) IV Continuous <Continuous>  heparin   Injectable 5000 Unit(s) SubCutaneous every 8 hours  levoFLOXacin IVPB 750 milliGRAM(s) IV Intermittent every 24 hours  metroNIDAZOLE  IVPB 500 milliGRAM(s) IV Intermittent every 8 hours    MEDICATIONS  (PRN):  HYDROmorphone  Injectable 0.5 milliGRAM(s) IV Push every 4 hours PRN Moderate Pain (4 - 6)  ondansetron Injectable 4 milliGRAM(s) IV Push every 6 hours PRN Nausea          Vital Signs Last 24 Hrs  T(C): 37.3 (06 Oct 2022 05:32), Max: 37.3 (06 Oct 2022 05:32)  T(F): 99.1 (06 Oct 2022 05:32), Max: 99.1 (06 Oct 2022 05:32)  HR: 59 (06 Oct 2022 05:32) (59 - 87)  BP: 134/64 (06 Oct 2022 05:32) (123/84 - 134/64)  BP(mean): --  RR: 17 (06 Oct 2022 05:32) (16 - 18)  SpO2: 94% (06 Oct 2022 05:32) (94% - 99%)    Parameters below as of 06 Oct 2022 05:32  Patient On (Oxygen Delivery Method): room air          General:  Well developed, well nourished, alert and active, no pallor, NAD.  HEENT:    Normal appearance of conjunctiva, ears, nose, lips, oropharynx, and oral mucosa, anicteric.  Neck:  No masses, no asymmetry.  Lymph Nodes:  No lymphadenopathy.   Cardiovascular:  RRR normal S1/S2, no murmur.  Respiratory:  CTA B/L, normal respiratory effort.   Abdominal:   soft, no masses or tenderness, normoactive BS, NT/ND, no HSM.  Extremities:   No clubbing or cyanosis, normal capillary refill, no edema.   Skin:   No rash, jaundice, lesions, eczema.   Musculoskeletal:  No joint swelling, erythema or tenderness.   Neuro: No focal deficits.   Other:       LABS:                        12.9   6.46  )-----------( 200      ( 06 Oct 2022 08:27 )             40.5     10-06    141  |  108  |  3<L>  ----------------------------<  138<H>  3.8   |  26  |  0.66    Ca    9.6      06 Oct 2022 08:27    TPro  6.4  /  Alb  2.7<L>  /  TBili  1.3<H>  /  DBili  0.9<H>  /  AST  67<H>  /  ALT  212<H>  /  AlkPhos  226<H>  10-06    PT/INR - ( 06 Oct 2022 08:27 )   PT: 12.6 sec;   INR: 1.06 ratio         PTT - ( 06 Oct 2022 08:27 )  PTT:31.9 sec        RADIOLOGY RESULTS:  ______________________________________________________________________________________  < from: CT Abdomen and Pelvis w/ IV Cont (10.04.22 @ 04:08) >  FINDINGS:  LOWER CHEST: Mild bibasilar atelectasis.  LIVER: Within normal limits.  BILE DUCTS: Normal caliber.  GALLBLADDER: Gallbladder wall nodular thickening and enhancement with   gallbladder wall edema/pericholecystic fluid. Enhancement of the cystic   duct also seen. In addition, a few small bubbles of air density are seen   within the gallbladder wall and/or lumen.  SPLEEN: Within normal limits.  PANCREAS: Within normal limits.  ADRENALS: Nonspecific 1.9 cm right adrenal gland nodule. Nonspecific 2.4   cm medial left adrenal gland nodule and nonspecific 2 cm lateral left   adrenal gland nodule.  KIDNEYS/URETERS: Kidneys enhance symmetrically without hydronephrosis.   Tiny low-attenuation renal structures are too small to characterize.   Punctate nonobstructing upper pole right renal stone is seen. Ureters are   not dilated.    BLADDER: Limited evaluation of the pelvic contents including bladder due   to metallic streak artifact from bilateral hip prostheses.  REPRODUCTIVE ORGANS: Limited    BOWEL: Evaluation of bowel is limited due to the lack of oral contrast,   however there is no bowel obstruction. Colonic diverticulosis without   acute diverticulitis. There is segmental thickening of the sigmoid colon   in a region of multiple diverticula, question chronic diverticular   disease, underlying neoplasm cannot be excluded. Recommend further   evaluation with colonoscopy. Normal appendix without appendicitis. Small   bowel loops are not dilated. Large hiatal hernia.  PERITONEUM: No free air. Trace ascites adjacent to the hepatic tip versus   artifact.  VESSELS:  No abdominal aortic aneurysm or dissection.  RETROPERITONEUM: No lymphadenopathy.  ABDOMINAL WALL: Moderate fat-containing periumbilical hernia. Calcified   buttock granulomas are also seen.  BONES: Bilateral hip prosthesis. Multilevel degenerative changes of the   spine. Grade 1 anterolisthesis of L4 on L5.    IMPRESSION:  Abnormal appearance of the gallbladder which demonstrates thickened and   enhancing wall with gallbladder wall edema and small amount of   wall/intraluminal gas. Correlate with emphysematous cholecystitis.   Recommend surgical consult.  Dr. Brock notified Dr. Saucedo on 10/4/2022 at 4:42 AM Eastern standard   time with read back.    There is segmental thickening of the sigmoid colon in a region of   multiple diverticula, question chronic diverticular disease, underlying   neoplasm cannot be excluded. Recommend further evaluation with   colonoscopy.    Nonspecific bilateral adrenal gland nodules.  ______________________________________________________________________________________  < from: US Hepatic & Pancreatic (10.04.22 @ 10:18) >  FINDINGS:  Liver: Within normal limits.  Bile ducts: Normal caliber. Common bile duct measures 4 mm.  Gallbladder: Thickened gallbladder wall. 6 mm intraluminal echogenic   focus, indeterminate. Negative sonographic Flower's sign. CT imaging   demonstrated emphysematous cholecystitis.  Pancreas: Visualized portions are within normal limits.  Right kidney: 10.5 cm. No hydronephrosis.  Ascites: None.  IVC: Visualized portions are within normal limits.    IMPRESSION:  Only gallbladder wall thickening and intraluminal echoes are   sonographically identified in this patient with emphysematous   cholecystitis on CT imaging.  ______________________________________________________________________________________  < from: MR MRCP w/wo IV Cont (10.05.22 @ 14:17) >  FINDINGS:  LOWER CHEST: Moderately large hiatal hernia.    LIVER: Within normal limits.  BILE DUCTS: Normal caliber. No evidence of choledocholithiasis.  GALLBLADDER: Trace pericholecystic edema/inflammation. Questionable   possibility of gallstones. Mural enhancement involving the gallbladder   and the region of cystic duct.  SPLEEN: Within normal limits.  PANCREAS: Within normal limits. Divisum morphology.  ADRENALS: Bilateral adrenal adenomata.  KIDNEYS/URETERS: A few small cysts bilaterally.    VISUALIZED PORTIONS:  BOWEL: Uncomplicated diverticula.  PERITONEUM: No ascites.  VESSELS: Within normal limits.  RETROPERITONEUM/LYMPH NODES: No lymphadenopathy.  ABDOMINAL WALL: Fat-containing umbilical hernia  BONES: Degenerative changes. Spinal stenosis at L4-5 level.    IMPRESSION:  No evidence of choledocholithiasis or biliary obstruction.  Trace pericholecystic fluid and mural enhancement. Only questionable   possibility of cholelithiasis.           Summary:   73y  Female      Subjective:   No overnight events reported. Tolerated clear liquid diet overnight.       Objective:  MEDICATIONS  (STANDING):  dextrose 5% + sodium chloride 0.45%. 1000 milliLiter(s) (120 mL/Hr) IV Continuous <Continuous>  heparin   Injectable 5000 Unit(s) SubCutaneous every 8 hours  levoFLOXacin IVPB 750 milliGRAM(s) IV Intermittent every 24 hours  metroNIDAZOLE  IVPB 500 milliGRAM(s) IV Intermittent every 8 hours    MEDICATIONS  (PRN):  HYDROmorphone  Injectable 0.5 milliGRAM(s) IV Push every 4 hours PRN Moderate Pain (4 - 6)  ondansetron Injectable 4 milliGRAM(s) IV Push every 6 hours PRN Nausea          Vital Signs Last 24 Hrs  T(C): 37.3 (06 Oct 2022 05:32), Max: 37.3 (06 Oct 2022 05:32)  T(F): 99.1 (06 Oct 2022 05:32), Max: 99.1 (06 Oct 2022 05:32)  HR: 59 (06 Oct 2022 05:32) (59 - 87)  BP: 134/64 (06 Oct 2022 05:32) (123/84 - 134/64)  BP(mean): --  RR: 17 (06 Oct 2022 05:32) (16 - 18)  SpO2: 94% (06 Oct 2022 05:32) (94% - 99%)    Parameters below as of 06 Oct 2022 05:32  Patient On (Oxygen Delivery Method): room air          General:  Well developed, well nourished, alert and active, no pallor, NAD.  HEENT:    Normal appearance of conjunctiva, ears, nose, lips, oropharynx, and oral mucosa, anicteric.  Neck:  No masses, no asymmetry.  Lymph Nodes:  No lymphadenopathy.   Cardiovascular:  RRR normal S1/S2, no murmur.  Respiratory:  CTA B/L, normal respiratory effort.   Abdominal:   soft, no masses or tenderness, normoactive BS, NT/ND, no HSM.  Extremities:   No clubbing or cyanosis, normal capillary refill, no edema.   Skin:   No rash, jaundice, lesions, eczema.   Musculoskeletal:  No joint swelling, erythema or tenderness.   Neuro: No focal deficits.   Other:       LABS:                        12.9   6.46  )-----------( 200      ( 06 Oct 2022 08:27 )             40.5     10-06    141  |  108  |  3<L>  ----------------------------<  138<H>  3.8   |  26  |  0.66    Ca    9.6      06 Oct 2022 08:27    TPro  6.4  /  Alb  2.7<L>  /  TBili  1.3<H>  /  DBili  0.9<H>  /  AST  67<H>  /  ALT  212<H>  /  AlkPhos  226<H>  10-06    PT/INR - ( 06 Oct 2022 08:27 )   PT: 12.6 sec;   INR: 1.06 ratio         PTT - ( 06 Oct 2022 08:27 )  PTT:31.9 sec        RADIOLOGY RESULTS:  ______________________________________________________________________________________  < from: CT Abdomen and Pelvis w/ IV Cont (10.04.22 @ 04:08) >  FINDINGS:  LOWER CHEST: Mild bibasilar atelectasis.  LIVER: Within normal limits.  BILE DUCTS: Normal caliber.  GALLBLADDER: Gallbladder wall nodular thickening and enhancement with   gallbladder wall edema/pericholecystic fluid. Enhancement of the cystic   duct also seen. In addition, a few small bubbles of air density are seen   within the gallbladder wall and/or lumen.  SPLEEN: Within normal limits.  PANCREAS: Within normal limits.  ADRENALS: Nonspecific 1.9 cm right adrenal gland nodule. Nonspecific 2.4   cm medial left adrenal gland nodule and nonspecific 2 cm lateral left   adrenal gland nodule.  KIDNEYS/URETERS: Kidneys enhance symmetrically without hydronephrosis.   Tiny low-attenuation renal structures are too small to characterize.   Punctate nonobstructing upper pole right renal stone is seen. Ureters are   not dilated.    BLADDER: Limited evaluation of the pelvic contents including bladder due   to metallic streak artifact from bilateral hip prostheses.  REPRODUCTIVE ORGANS: Limited    BOWEL: Evaluation of bowel is limited due to the lack of oral contrast,   however there is no bowel obstruction. Colonic diverticulosis without   acute diverticulitis. There is segmental thickening of the sigmoid colon   in a region of multiple diverticula, question chronic diverticular   disease, underlying neoplasm cannot be excluded. Recommend further   evaluation with colonoscopy. Normal appendix without appendicitis. Small   bowel loops are not dilated. Large hiatal hernia.  PERITONEUM: No free air. Trace ascites adjacent to the hepatic tip versus   artifact.  VESSELS:  No abdominal aortic aneurysm or dissection.  RETROPERITONEUM: No lymphadenopathy.  ABDOMINAL WALL: Moderate fat-containing periumbilical hernia. Calcified   buttock granulomas are also seen.  BONES: Bilateral hip prosthesis. Multilevel degenerative changes of the   spine. Grade 1 anterolisthesis of L4 on L5.    IMPRESSION:  Abnormal appearance of the gallbladder which demonstrates thickened and   enhancing wall with gallbladder wall edema and small amount of   wall/intraluminal gas. Correlate with emphysematous cholecystitis.   Recommend surgical consult.  Dr. Brock notified Dr. Saucedo on 10/4/2022 at 4:42 AM Eastern standard   time with read back.    There is segmental thickening of the sigmoid colon in a region of   multiple diverticula, question chronic diverticular disease, underlying   neoplasm cannot be excluded. Recommend further evaluation with   colonoscopy.    Nonspecific bilateral adrenal gland nodules.  ______________________________________________________________________________________  < from: US Hepatic & Pancreatic (10.04.22 @ 10:18) >  FINDINGS:  Liver: Within normal limits.  Bile ducts: Normal caliber. Common bile duct measures 4 mm.  Gallbladder: Thickened gallbladder wall. 6 mm intraluminal echogenic   focus, indeterminate. Negative sonographic Flower's sign. CT imaging   demonstrated emphysematous cholecystitis.  Pancreas: Visualized portions are within normal limits.  Right kidney: 10.5 cm. No hydronephrosis.  Ascites: None.  IVC: Visualized portions are within normal limits.    IMPRESSION:  Only gallbladder wall thickening and intraluminal echoes are   sonographically identified in this patient with emphysematous   cholecystitis on CT imaging.  ______________________________________________________________________________________  < from: MR MRCP w/wo IV Cont (10.05.22 @ 14:17) >  FINDINGS:  LOWER CHEST: Moderately large hiatal hernia.    LIVER: Within normal limits.  BILE DUCTS: Normal caliber. No evidence of choledocholithiasis.  GALLBLADDER: Trace pericholecystic edema/inflammation. Questionable   possibility of gallstones. Mural enhancement involving the gallbladder   and the region of cystic duct.  SPLEEN: Within normal limits.  PANCREAS: Within normal limits. Divisum morphology.  ADRENALS: Bilateral adrenal adenomata.  KIDNEYS/URETERS: A few small cysts bilaterally.    VISUALIZED PORTIONS:  BOWEL: Uncomplicated diverticula.  PERITONEUM: No ascites.  VESSELS: Within normal limits.  RETROPERITONEUM/LYMPH NODES: No lymphadenopathy.  ABDOMINAL WALL: Fat-containing umbilical hernia  BONES: Degenerative changes. Spinal stenosis at L4-5 level.    IMPRESSION:  No evidence of choledocholithiasis or biliary obstruction.  Trace pericholecystic fluid and mural enhancement. Only questionable   possibility of cholelithiasis.           Attending Attestation (For Attendings USE Only)...

## 2023-09-19 NOTE — ED ADULT NURSE NOTE - NS ED NURSE DC INFO COMPLEXITY
Chronic ongoing issue, continues to work with [facility] [place] pelvic floor physical therapy and thinks it is helping.  
Chronic ongoing issue, now has been recommended to consider a patch through what sounds like an arthroscopic procedure to help with worsening right shoulder pain.  Will add comprehensive lab work for preoperative testing if indicated.  
Chronic stable problem.  Continue medical therapy with levothyroxine 88 mcg daily with 2 tablets on Sundays and continue regular follow-up of thyroid labs to ensure stability.  
Chronic stable problem.  Plan to recheck this fall to ensure ongoing stability.  She is holding her statin for 10 days while on Paxlovid for treatment of COVID-19 infection.  When appropriate continue atorvastatin 80 mg daily and continue aspirin 81 mg daily.  
Chronic stable problem.  Prescribed this in June 2023, does not appear she ever started taking it.  Continue to monitor prediabetes to ensure no transition to type 2 diabetes.  
Sent problem, improving as expected, I think she is okay to attend her cruise that she will be 9 days out from onset of infection and will have completed her Paxlovid prescription.  She is essentially asymptomatic at this time.  She will continue to hold her atorvastatin until 5 days after completing Paxlovid.  She knows to monitor for rebound COVID.  
Simple: Patient demonstrates quick and easy understanding

## 2023-11-15 ENCOUNTER — APPOINTMENT (OUTPATIENT)
Dept: ANTEPARTUM | Facility: CLINIC | Age: 38
End: 2023-11-15
Payer: COMMERCIAL

## 2023-11-15 ENCOUNTER — ASOB RESULT (OUTPATIENT)
Age: 38
End: 2023-11-15

## 2023-11-15 VITALS
SYSTOLIC BLOOD PRESSURE: 128 MMHG | BODY MASS INDEX: 28.71 KG/M2 | WEIGHT: 156 LBS | HEART RATE: 81 BPM | DIASTOLIC BLOOD PRESSURE: 82 MMHG | HEIGHT: 62 IN

## 2023-11-15 PROCEDURE — 99204 OFFICE O/P NEW MOD 45 MIN: CPT

## 2023-11-15 PROCEDURE — 76815 OB US LIMITED FETUS(S): CPT

## 2023-12-05 ENCOUNTER — ASOB RESULT (OUTPATIENT)
Age: 38
End: 2023-12-05

## 2023-12-05 ENCOUNTER — LABORATORY RESULT (OUTPATIENT)
Age: 38
End: 2023-12-05

## 2023-12-05 ENCOUNTER — APPOINTMENT (OUTPATIENT)
Dept: ANTEPARTUM | Facility: CLINIC | Age: 38
End: 2023-12-05
Payer: COMMERCIAL

## 2023-12-05 ENCOUNTER — APPOINTMENT (OUTPATIENT)
Dept: MATERNAL FETAL MEDICINE | Facility: CLINIC | Age: 38
End: 2023-12-05
Payer: COMMERCIAL

## 2023-12-05 ENCOUNTER — NON-APPOINTMENT (OUTPATIENT)
Age: 38
End: 2023-12-05

## 2023-12-05 PROCEDURE — 76815 OB US LIMITED FETUS(S): CPT

## 2023-12-05 PROCEDURE — 99214 OFFICE O/P EST MOD 30 MIN: CPT | Mod: 25

## 2023-12-05 PROCEDURE — 36415 COLL VENOUS BLD VENIPUNCTURE: CPT

## 2023-12-05 PROCEDURE — ZZZZZ: CPT

## 2023-12-06 ENCOUNTER — LABORATORY RESULT (OUTPATIENT)
Age: 38
End: 2023-12-06

## 2023-12-06 ENCOUNTER — NON-APPOINTMENT (OUTPATIENT)
Age: 38
End: 2023-12-06

## 2023-12-06 ENCOUNTER — APPOINTMENT (OUTPATIENT)
Dept: OBGYN | Facility: CLINIC | Age: 38
End: 2023-12-06
Payer: COMMERCIAL

## 2023-12-06 VITALS
WEIGHT: 152.38 LBS | SYSTOLIC BLOOD PRESSURE: 125 MMHG | DIASTOLIC BLOOD PRESSURE: 83 MMHG | BODY MASS INDEX: 27.87 KG/M2

## 2023-12-06 DIAGNOSIS — O30.131: ICD-10-CM

## 2023-12-06 DIAGNOSIS — Z83.3 FAMILY HISTORY OF DIABETES MELLITUS: ICD-10-CM

## 2023-12-06 DIAGNOSIS — Z82.49 FAMILY HISTORY OF ISCHEMIC HEART DISEASE AND OTHER DISEASES OF THE CIRCULATORY SYSTEM: ICD-10-CM

## 2023-12-06 DIAGNOSIS — Z82.3 FAMILY HISTORY OF STROKE: ICD-10-CM

## 2023-12-06 DIAGNOSIS — Z34.90 ENCOUNTER FOR SUPERVISION OF NORMAL PREGNANCY, UNSPECIFIED, UNSPECIFIED TRIMESTER: ICD-10-CM

## 2023-12-06 DIAGNOSIS — R33.9 RETENTION OF URINE, UNSPECIFIED: ICD-10-CM

## 2023-12-06 DIAGNOSIS — Z33.2 ENCOUNTER FOR ELECTIVE TERMINATION OF PREGNANCY: ICD-10-CM

## 2023-12-06 DIAGNOSIS — G43.909 MIGRAINE, UNSPECIFIED, NOT INTRACTABLE, W/OUT STATUS MIGRAINOSUS: ICD-10-CM

## 2023-12-06 DIAGNOSIS — O23.40 UNSPECIFIED INFECTION OF URINARY TRACT IN PREGNANCY, UNSPECIFIED TRIMESTER: ICD-10-CM

## 2023-12-06 DIAGNOSIS — Z83.49 FAMILY HISTORY OF OTHER ENDOCRINE, NUTRITIONAL AND METABOLIC DISEASES: ICD-10-CM

## 2023-12-06 PROCEDURE — 99214 OFFICE O/P EST MOD 30 MIN: CPT | Mod: 25

## 2023-12-06 PROCEDURE — 0500F INITIAL PRENATAL CARE VISIT: CPT

## 2023-12-06 RX ORDER — CALCIUM CARBONATE 500 MG/1
TABLET, CHEWABLE ORAL
Refills: 0 | Status: ACTIVE | COMMUNITY

## 2023-12-06 RX ORDER — MELATONIN 3 MG
TABLET ORAL
Refills: 0 | Status: ACTIVE | COMMUNITY

## 2023-12-07 LAB
ALBUMIN SERPL ELPH-MCNC: 3.8 G/DL
ALP BLD-CCNC: 90 U/L
ALT SERPL-CCNC: 7 U/L
ANION GAP SERPL CALC-SCNC: 18 MMOL/L
AST SERPL-CCNC: 14 U/L
BILIRUB SERPL-MCNC: 0.2 MG/DL
BUN SERPL-MCNC: 7 MG/DL
CALCIUM SERPL-MCNC: 9.5 MG/DL
CHLORIDE SERPL-SCNC: 103 MMOL/L
CMV IGG SERPL QL: >10 U/ML
CMV IGG SERPL-IMP: POSITIVE
CO2 SERPL-SCNC: 19 MMOL/L
CREAT SERPL-MCNC: 0.5 MG/DL
EGFR: 122 ML/MIN/1.73M2
ESTIMATED AVERAGE GLUCOSE: 100 MG/DL
FOLATE SERPL-MCNC: >20 NG/ML
GLUCOSE SERPL-MCNC: 67 MG/DL
HBA1C MFR BLD HPLC: 5.1 %
HBV SURFACE AG SER QL: NONREACTIVE
HCT VFR BLD CALC: 38.1 %
HCV AB SER QL: NONREACTIVE
HCV S/CO RATIO: 0.12 S/CO
HGB A MFR BLD: 97.8 %
HGB A2 MFR BLD: 2.2 %
HGB BLD-MCNC: 12.2 G/DL
HGB FRACT BLD-IMP: NORMAL
HIV1+2 AB SPEC QL IA.RAPID: NONREACTIVE
IRON SATN MFR SERPL: 9 %
IRON SERPL-MCNC: 45 UG/DL
LEAD BLD-MCNC: <1 UG/DL
MCHC RBC-ENTMCNC: 28.2 PG
MCHC RBC-ENTMCNC: 32 GM/DL
MCV RBC AUTO: 88 FL
MEV IGG FLD QL IA: 44.6 AU/ML
MEV IGG+IGM SER-IMP: POSITIVE
PLATELET # BLD AUTO: 350 K/UL
POTASSIUM SERPL-SCNC: 4.8 MMOL/L
PROT SERPL-MCNC: 6.9 G/DL
RBC # BLD: 4.33 M/UL
RBC # FLD: 14.5 %
RUBV IGG FLD-ACNC: 8.9 INDEX
RUBV IGG SER-IMP: POSITIVE
SODIUM SERPL-SCNC: 140 MMOL/L
T PALLIDUM AB SER QL IA: NEGATIVE
TIBC SERPL-MCNC: 487 UG/DL
TSH SERPL-ACNC: 0.01 UIU/ML
UIBC SERPL-MCNC: 442 UG/DL
VIT B12 SERPL-MCNC: 566 PG/ML
VZV AB TITR SER: POSITIVE
VZV IGG SER IF-ACNC: 466.2 INDEX
WBC # FLD AUTO: 11.91 K/UL

## 2023-12-08 LAB — BACTERIA UR CULT: NORMAL

## 2023-12-11 LAB
B19V IGG SER QL IA: POSITIVE
B19V IGG+IGM SER-IMP: NORMAL
B19V IGM FLD-ACNC: NEGATIVE

## 2023-12-12 ENCOUNTER — LABORATORY RESULT (OUTPATIENT)
Age: 38
End: 2023-12-12

## 2023-12-12 ENCOUNTER — NON-APPOINTMENT (OUTPATIENT)
Age: 38
End: 2023-12-12

## 2023-12-12 ENCOUNTER — APPOINTMENT (OUTPATIENT)
Dept: ANTEPARTUM | Facility: CLINIC | Age: 38
End: 2023-12-12
Payer: COMMERCIAL

## 2023-12-12 PROCEDURE — 99213 OFFICE O/P EST LOW 20 MIN: CPT | Mod: 25

## 2023-12-12 PROCEDURE — 59015 CHORION BIOPSY: CPT

## 2023-12-12 PROCEDURE — 76945 ECHO GUIDE VILLUS SAMPLING: CPT

## 2023-12-14 ENCOUNTER — NON-APPOINTMENT (OUTPATIENT)
Age: 38
End: 2023-12-14

## 2023-12-20 ENCOUNTER — NON-APPOINTMENT (OUTPATIENT)
Age: 38
End: 2023-12-20

## 2023-12-27 ENCOUNTER — APPOINTMENT (OUTPATIENT)
Dept: ANTEPARTUM | Facility: CLINIC | Age: 38
End: 2023-12-27
Payer: COMMERCIAL

## 2023-12-27 ENCOUNTER — ASOB RESULT (OUTPATIENT)
Age: 38
End: 2023-12-27

## 2023-12-27 PROCEDURE — 76801 OB US < 14 WKS SINGLE FETUS: CPT

## 2023-12-27 PROCEDURE — 59897 UNLISTED FETAL INVAS PX W/US: CPT

## 2023-12-27 PROCEDURE — 76802 OB US < 14 WKS ADDL FETUS: CPT

## 2023-12-28 ENCOUNTER — NON-APPOINTMENT (OUTPATIENT)
Age: 38
End: 2023-12-28

## 2023-12-29 NOTE — ED PROVIDER NOTE - NSFOLLOWUPCLINICS_GEN_ALL_ED_FT
pharmacy. Discharge instructions given. discharged home with mom and dad.   [facility]  Urology  [address] [place] [address] - [address] [place] [address]  Phone: [phone]  Fax:

## 2024-01-02 ENCOUNTER — APPOINTMENT (OUTPATIENT)
Dept: ANTEPARTUM | Facility: CLINIC | Age: 39
End: 2024-01-02
Payer: COMMERCIAL

## 2024-01-02 ENCOUNTER — ASOB RESULT (OUTPATIENT)
Age: 39
End: 2024-01-02

## 2024-01-02 PROCEDURE — 99212 OFFICE O/P EST SF 10 MIN: CPT | Mod: 25

## 2024-01-02 PROCEDURE — 76815 OB US LIMITED FETUS(S): CPT

## 2024-01-03 ENCOUNTER — NON-APPOINTMENT (OUTPATIENT)
Age: 39
End: 2024-01-03

## 2024-01-05 ENCOUNTER — APPOINTMENT (OUTPATIENT)
Dept: OBGYN | Facility: CLINIC | Age: 39
End: 2024-01-05
Payer: COMMERCIAL

## 2024-01-05 VITALS — BODY MASS INDEX: 28.17 KG/M2 | WEIGHT: 154 LBS | DIASTOLIC BLOOD PRESSURE: 78 MMHG | SYSTOLIC BLOOD PRESSURE: 124 MMHG

## 2024-01-05 DIAGNOSIS — O26.879 CERVICAL SHORTENING, UNSPECIFIED TRIMESTER: ICD-10-CM

## 2024-01-05 PROCEDURE — 0502F SUBSEQUENT PRENATAL CARE: CPT

## 2024-01-09 ENCOUNTER — ASOB RESULT (OUTPATIENT)
Age: 39
End: 2024-01-09

## 2024-01-09 ENCOUNTER — APPOINTMENT (OUTPATIENT)
Dept: ANTEPARTUM | Facility: CLINIC | Age: 39
End: 2024-01-09
Payer: COMMERCIAL

## 2024-01-09 PROBLEM — O26.879 CERVIX, SHORT (AFFECTING PREGNANCY): Status: ACTIVE | Noted: 2024-01-09

## 2024-01-09 PROCEDURE — 99212 OFFICE O/P EST SF 10 MIN: CPT | Mod: 25

## 2024-01-09 PROCEDURE — 76817 TRANSVAGINAL US OBSTETRIC: CPT

## 2024-01-09 PROCEDURE — 76805 OB US >/= 14 WKS SNGL FETUS: CPT

## 2024-01-09 PROCEDURE — 76810 OB US >/= 14 WKS ADDL FETUS: CPT

## 2024-01-16 ENCOUNTER — NON-APPOINTMENT (OUTPATIENT)
Age: 39
End: 2024-01-16

## 2024-01-16 ENCOUNTER — APPOINTMENT (OUTPATIENT)
Dept: ANTEPARTUM | Facility: CLINIC | Age: 39
End: 2024-01-16
Payer: COMMERCIAL

## 2024-01-16 ENCOUNTER — ASOB RESULT (OUTPATIENT)
Age: 39
End: 2024-01-16

## 2024-01-16 PROCEDURE — 76815 OB US LIMITED FETUS(S): CPT

## 2024-01-16 PROCEDURE — 99212 OFFICE O/P EST SF 10 MIN: CPT | Mod: 25

## 2024-01-16 PROCEDURE — 76817 TRANSVAGINAL US OBSTETRIC: CPT

## 2024-01-17 ENCOUNTER — NON-APPOINTMENT (OUTPATIENT)
Age: 39
End: 2024-01-17

## 2024-01-23 ENCOUNTER — ASOB RESULT (OUTPATIENT)
Age: 39
End: 2024-01-23

## 2024-01-23 ENCOUNTER — APPOINTMENT (OUTPATIENT)
Dept: ANTEPARTUM | Facility: CLINIC | Age: 39
End: 2024-01-23
Payer: COMMERCIAL

## 2024-01-23 PROCEDURE — 76817 TRANSVAGINAL US OBSTETRIC: CPT

## 2024-01-23 PROCEDURE — 76815 OB US LIMITED FETUS(S): CPT

## 2024-01-23 PROCEDURE — 99212 OFFICE O/P EST SF 10 MIN: CPT | Mod: 25

## 2024-01-24 ENCOUNTER — NON-APPOINTMENT (OUTPATIENT)
Age: 39
End: 2024-01-24

## 2024-01-25 ENCOUNTER — TRANSCRIPTION ENCOUNTER (OUTPATIENT)
Age: 39
End: 2024-01-25

## 2024-01-25 ENCOUNTER — INPATIENT (INPATIENT)
Facility: HOSPITAL | Age: 39
LOS: 0 days | Discharge: HOME | DRG: 819 | End: 2024-01-26
Attending: OBSTETRICS & GYNECOLOGY | Admitting: OBSTETRICS & GYNECOLOGY
Payer: COMMERCIAL

## 2024-01-25 ENCOUNTER — NON-APPOINTMENT (OUTPATIENT)
Age: 39
End: 2024-01-25

## 2024-01-25 VITALS
SYSTOLIC BLOOD PRESSURE: 104 MMHG | OXYGEN SATURATION: 98 % | HEART RATE: 87 BPM | RESPIRATION RATE: 18 BRPM | TEMPERATURE: 98 F | DIASTOLIC BLOOD PRESSURE: 68 MMHG

## 2024-01-25 DIAGNOSIS — O34.30 MATERNAL CARE FOR CERVICAL INCOMPETENCE, UNSPECIFIED TRIMESTER: ICD-10-CM

## 2024-01-25 DIAGNOSIS — Z98.890 OTHER SPECIFIED POSTPROCEDURAL STATES: Chronic | ICD-10-CM

## 2024-01-25 PROCEDURE — 99238 HOSP IP/OBS DSCHRG MGMT 30/<: CPT

## 2024-01-25 PROCEDURE — 99222 1ST HOSP IP/OBS MODERATE 55: CPT

## 2024-01-25 RX ORDER — SODIUM CHLORIDE 9 MG/ML
1000 INJECTION, SOLUTION INTRAVENOUS
Refills: 0 | Status: DISCONTINUED | OUTPATIENT
Start: 2024-01-25 | End: 2024-01-25

## 2024-01-25 RX ORDER — SODIUM CHLORIDE 9 MG/ML
1000 INJECTION, SOLUTION INTRAVENOUS
Refills: 0 | Status: DISCONTINUED | OUTPATIENT
Start: 2024-01-25 | End: 2024-01-26

## 2024-01-25 NOTE — OB PROVIDER H&P - ATTENDING COMMENTS
OB attg note    Agree w above.  38y  at 17w3d with DCDA twins and poor OB hx with known short cervix here for cerclage in setting of vaginal shortening on vag P. Scheduled for cerclage today.    Mary BANKS

## 2024-01-25 NOTE — OB PROVIDER H&P - ASSESSMENT
38y  at 17w3d w/ di/di TIUP and short cervix presents for admission for cerclage on .    #Short Cervix  - CL 1.5cm   - For cerclage     #Fetal wellbeing  - FH QD    #Maternal wellbeing  - NPO  - SCDs for DVT ppx  - PNV/Iron/Colace/Folic acid  - f/u UCx    Nurys Cleaningu  PGY3

## 2024-01-25 NOTE — OB PROVIDER H&P - HISTORY OF PRESENT ILLNESS
R3 Admission H&P    Subjective  HPI: 38y  at 17w3d presents for admission for cerclage on . +FM. -LOF. -CTXs. -VB. Pt denies any other concerns.    Pt presents for cerclage due to multiple gestation with cervical length of 1.4-1.5cm.     – PNC: IUI pregnancy of triplets reduced to twins.  – OBHx:  emergent classical  section @ 21w 2/2 bleeding previa and previable PPROM, TOPx2 s/p D&Cx1, SABx1 s/p D&Cx1  – GynHx: denies fibroids, cysts, endometriosis, abnormal pap smears, STIs  – PMH: anemia, sciatica  – PSH: CSx1, D&Cx2  – Psych: denies   – Social: denies   – Meds: PNV, vaginal progesterone  – Allergies: Gianvi (rash)  – Will accept blood transfusions? Yes    Objective  – VS  T(C): --  HR: --  BP: --  RR: --  SpO2: --    Physical Exam  CV: RRR  Pulm: breathing comfortably on RA  Abd: gravid, nontender  Extr: moving all extremities with ease    – Sono: A - , B -  R3 Admission H&P    Subjective  HPI: 38y  at 17w3d presents for admission for cerclage on . +FM. -LOF. -CTXs. -VB. Pt denies any other concerns.    Pt presents for cerclage due to multiple gestation with cervical length of 1.4-1.5cm.     – PNC: IUI pregnancy of triplets reduced to twins, received Rhogam 23 prior to reduction.  – OBHx: ' emergent classical  section @ 21w 2/2 bleeding previa and previable PPROM, TOPx2 s/p D&Cx1, SABx1 s/p D&Cx1  – GynHx: denies fibroids, cysts, endometriosis, abnormal pap smears, STIs  – PMH: anemia, sciatica  – PSH: CSx1, D&Cx2  – Psych: denies   – Social: denies   – Meds: PNV, vaginal progesterone  – Allergies: Gianvi (rash)  – Will accept blood transfusions? Yes    Objective  – VS  T(C): --  HR: --  BP: --  RR: --  SpO2: --    Physical Exam  CV: RRR  Pulm: breathing comfortably on RA  Abd: gravid, nontender  Extr: moving all extremities with ease    – Sono: A - , B -

## 2024-01-26 ENCOUNTER — TRANSCRIPTION ENCOUNTER (OUTPATIENT)
Age: 39
End: 2024-01-26

## 2024-01-26 VITALS — HEART RATE: 89 BPM | RESPIRATION RATE: 16 BRPM | OXYGEN SATURATION: 100 % | TEMPERATURE: 98 F

## 2024-01-26 LAB
ALBUMIN SERPL ELPH-MCNC: 3.4 G/DL — SIGNIFICANT CHANGE UP (ref 3.3–5)
ALP SERPL-CCNC: 81 U/L — SIGNIFICANT CHANGE UP (ref 40–120)
ALT FLD-CCNC: 6 U/L — LOW (ref 10–45)
ANION GAP SERPL CALC-SCNC: 14 MMOL/L — SIGNIFICANT CHANGE UP (ref 5–17)
APPEARANCE UR: CLEAR — SIGNIFICANT CHANGE UP
APTT BLD: 28.4 SEC — SIGNIFICANT CHANGE UP (ref 24.5–35.6)
AST SERPL-CCNC: 11 U/L — SIGNIFICANT CHANGE UP (ref 10–40)
BASOPHILS # BLD AUTO: 0.05 K/UL — SIGNIFICANT CHANGE UP (ref 0–0.2)
BASOPHILS NFR BLD AUTO: 0.5 % — SIGNIFICANT CHANGE UP (ref 0–2)
BILIRUB SERPL-MCNC: 0.2 MG/DL — SIGNIFICANT CHANGE UP (ref 0.2–1.2)
BILIRUB UR-MCNC: NEGATIVE — SIGNIFICANT CHANGE UP
BUN SERPL-MCNC: 5 MG/DL — LOW (ref 7–23)
CALCIUM SERPL-MCNC: 9 MG/DL — SIGNIFICANT CHANGE UP (ref 8.4–10.5)
CHLORIDE SERPL-SCNC: 103 MMOL/L — SIGNIFICANT CHANGE UP (ref 96–108)
CO2 SERPL-SCNC: 19 MMOL/L — LOW (ref 22–31)
COLOR SPEC: YELLOW — SIGNIFICANT CHANGE UP
CREAT SERPL-MCNC: 0.5 MG/DL — SIGNIFICANT CHANGE UP (ref 0.5–1.3)
DIFF PNL FLD: NEGATIVE — SIGNIFICANT CHANGE UP
EGFR: 122 ML/MIN/1.73M2 — SIGNIFICANT CHANGE UP
EOSINOPHIL # BLD AUTO: 0.19 K/UL — SIGNIFICANT CHANGE UP (ref 0–0.5)
EOSINOPHIL NFR BLD AUTO: 2 % — SIGNIFICANT CHANGE UP (ref 0–6)
FIBRINOGEN PPP-MCNC: 525 MG/DL — HIGH (ref 200–445)
GLUCOSE SERPL-MCNC: 94 MG/DL — SIGNIFICANT CHANGE UP (ref 70–99)
GLUCOSE UR QL: NEGATIVE MG/DL — SIGNIFICANT CHANGE UP
HCT VFR BLD CALC: 31.4 % — LOW (ref 34.5–45)
HGB BLD-MCNC: 9.3 G/DL — LOW (ref 11.5–15.5)
IMM GRANULOCYTES NFR BLD AUTO: 0.5 % — SIGNIFICANT CHANGE UP (ref 0–0.9)
KETONES UR-MCNC: NEGATIVE MG/DL — SIGNIFICANT CHANGE UP
LEUKOCYTE ESTERASE UR-ACNC: NEGATIVE — SIGNIFICANT CHANGE UP
LYMPHOCYTES # BLD AUTO: 2.82 K/UL — SIGNIFICANT CHANGE UP (ref 1–3.3)
LYMPHOCYTES # BLD AUTO: 30.2 % — SIGNIFICANT CHANGE UP (ref 13–44)
MCHC RBC-ENTMCNC: 25.6 PG — LOW (ref 27–34)
MCHC RBC-ENTMCNC: 29.6 GM/DL — LOW (ref 32–36)
MCV RBC AUTO: 86.5 FL — SIGNIFICANT CHANGE UP (ref 80–100)
MONOCYTES # BLD AUTO: 0.56 K/UL — SIGNIFICANT CHANGE UP (ref 0–0.9)
MONOCYTES NFR BLD AUTO: 6 % — SIGNIFICANT CHANGE UP (ref 2–14)
NEUTROPHILS # BLD AUTO: 5.67 K/UL — SIGNIFICANT CHANGE UP (ref 1.8–7.4)
NEUTROPHILS NFR BLD AUTO: 60.8 % — SIGNIFICANT CHANGE UP (ref 43–77)
NITRITE UR-MCNC: NEGATIVE — SIGNIFICANT CHANGE UP
NRBC # BLD: 0 /100 WBCS — SIGNIFICANT CHANGE UP (ref 0–0)
PH UR: 7 — SIGNIFICANT CHANGE UP (ref 5–8)
PLATELET # BLD AUTO: 384 K/UL — SIGNIFICANT CHANGE UP (ref 150–400)
POTASSIUM SERPL-MCNC: 3.5 MMOL/L — SIGNIFICANT CHANGE UP (ref 3.5–5.3)
POTASSIUM SERPL-SCNC: 3.5 MMOL/L — SIGNIFICANT CHANGE UP (ref 3.5–5.3)
PROT SERPL-MCNC: 6.7 G/DL — SIGNIFICANT CHANGE UP (ref 6–8.3)
PROT UR-MCNC: NEGATIVE MG/DL — SIGNIFICANT CHANGE UP
RBC # BLD: 3.63 M/UL — LOW (ref 3.8–5.2)
RBC # FLD: 13.5 % — SIGNIFICANT CHANGE UP (ref 10.3–14.5)
SODIUM SERPL-SCNC: 136 MMOL/L — SIGNIFICANT CHANGE UP (ref 135–145)
SP GR SPEC: <1.005 — LOW (ref 1–1.03)
URATE SERPL-MCNC: 3.9 MG/DL — SIGNIFICANT CHANGE UP (ref 2.5–7)
UROBILINOGEN FLD QL: 0.2 MG/DL — SIGNIFICANT CHANGE UP (ref 0.2–1)
WBC # BLD: 9.34 K/UL — SIGNIFICANT CHANGE UP (ref 3.8–10.5)
WBC # FLD AUTO: 9.34 K/UL — SIGNIFICANT CHANGE UP (ref 3.8–10.5)

## 2024-01-26 PROCEDURE — 86077 PHYS BLOOD BANK SERV XMATCH: CPT

## 2024-01-26 PROCEDURE — 85384 FIBRINOGEN ACTIVITY: CPT

## 2024-01-26 PROCEDURE — 81003 URINALYSIS AUTO W/O SCOPE: CPT

## 2024-01-26 PROCEDURE — 85025 COMPLETE CBC W/AUTO DIFF WBC: CPT

## 2024-01-26 PROCEDURE — 84550 ASSAY OF BLOOD/URIC ACID: CPT

## 2024-01-26 PROCEDURE — 59320 REVISION OF CERVIX: CPT

## 2024-01-26 PROCEDURE — 80053 COMPREHEN METABOLIC PANEL: CPT

## 2024-01-26 PROCEDURE — 85730 THROMBOPLASTIN TIME PARTIAL: CPT

## 2024-01-26 PROCEDURE — 86901 BLOOD TYPING SEROLOGIC RH(D): CPT

## 2024-01-26 PROCEDURE — 86870 RBC ANTIBODY IDENTIFICATION: CPT

## 2024-01-26 PROCEDURE — 86900 BLOOD TYPING SEROLOGIC ABO: CPT

## 2024-01-26 PROCEDURE — 86850 RBC ANTIBODY SCREEN: CPT

## 2024-01-26 RX ORDER — CITRIC ACID/SODIUM CITRATE 300-500 MG
15 SOLUTION, ORAL ORAL ONCE
Refills: 0 | Status: COMPLETED | OUTPATIENT
Start: 2024-01-26 | End: 2024-01-26

## 2024-01-26 RX ORDER — SODIUM CHLORIDE 9 MG/ML
1000 INJECTION, SOLUTION INTRAVENOUS
Refills: 0 | Status: DISCONTINUED | OUTPATIENT
Start: 2024-01-26 | End: 2024-01-26

## 2024-01-26 RX ORDER — INDOMETHACIN 50 MG
1 CAPSULE ORAL
Qty: 8 | Refills: 0
Start: 2024-01-26 | End: 2024-01-27

## 2024-01-26 RX ORDER — INDOMETHACIN 50 MG
50 CAPSULE ORAL ONCE
Refills: 0 | Status: COMPLETED | OUTPATIENT
Start: 2024-01-26 | End: 2024-01-26

## 2024-01-26 RX ORDER — FAMOTIDINE 10 MG/ML
20 INJECTION INTRAVENOUS ONCE
Refills: 0 | Status: COMPLETED | OUTPATIENT
Start: 2024-01-26 | End: 2024-01-26

## 2024-01-26 RX ADMIN — Medication 15 MILLILITER(S): at 10:10

## 2024-01-26 RX ADMIN — Medication 50 MILLIGRAM(S): at 13:14

## 2024-01-26 RX ADMIN — SODIUM CHLORIDE 125 MILLILITER(S): 9 INJECTION, SOLUTION INTRAVENOUS at 10:10

## 2024-01-26 RX ADMIN — FAMOTIDINE 20 MILLIGRAM(S): 10 INJECTION INTRAVENOUS at 10:10

## 2024-01-26 RX ADMIN — SODIUM CHLORIDE 125 MILLILITER(S): 9 INJECTION, SOLUTION INTRAVENOUS at 08:30

## 2024-01-26 NOTE — PROGRESS NOTE ADULT - ASSESSMENT
38y  at 17w4d w/ di/di TIUP and short cervix presents for admission for cerclage on .    #Short Cervix  - CL 1.5cm   - For cerclage     #Fetal wellbeing  - FH QD    #Maternal wellbeing  - NPO  - SCDs for DVT ppx  - PNV/Iron/Colace/Folic acid  - UA negative   - Rh -     Lisa Ramirez, PGY-3

## 2024-01-26 NOTE — DISCHARGE NOTE ANTEPARTUM - PATIENT PORTAL LINK FT
You can access the FollowMyHealth Patient Portal offered by [facility] by registering at the following website: [email or web address]. By joining [employer]’s FollowMyHealth portal, you will also be able to view your health information using other applications (apps) compatible with our system.

## 2024-01-26 NOTE — OB RN INTRAOPERATIVE NOTE - NS_ADDITIONALPROCINFO2_OBGYN_ALL_OB_FT
EBL 30 as per MD Cruz  Patient safely transferred via stretcher to OBRR #4 EBL 30 as per MD Cruz  Patient safely transferred via stretcher to OB #4  bedside sono for FH check done by MD Ramirez  Fetus A 141 , Fetus B 138

## 2024-01-26 NOTE — DISCHARGE NOTE ANTEPARTUM - HOSPITAL COURSE
38y  at 17w4d w/ di/di TIUP and short cervix presents for admission for cerclage on .      38y  at 17w4d w/ di/di TIUP and short cervix presents for scheduled cerclage on . Pt underwent uncomplicated Smyth cerclage on . EBL 30cc. Pt was able to void and ambulate without complication.   Pt for close outpatient follow up next week.

## 2024-01-26 NOTE — DISCHARGE NOTE ANTEPARTUM - MEDICATION SUMMARY - MEDICATIONS TO TAKE
I will START or STAY ON the medications listed below when I get home from the hospital:    indomethacin 25 mg oral capsule  -- 1 cap(s) by mouth every 6 hours  -- Indication: For Cervical insufficiency in pregnancy in second trimester, antepartum    progesterone 200 mg vaginal suppository  -- 1 suppository(ies) intravaginally once a day (at bedtime)  -- Indication: For Cervical insufficiency in pregnancy in second trimester, antepartum

## 2024-01-26 NOTE — BRIEF OPERATIVE NOTE - NSICDXBRIEFPREOP_GEN_ALL_CORE_FT
PRE-OP DIAGNOSIS:  Cervical insufficiency in pregnancy, antepartum, second trimester 26-Jan-2024 14:46:13  Lisa Ramirez

## 2024-01-26 NOTE — BRIEF OPERATIVE NOTE - OPERATION/FINDINGS
Examination under anesthesia demonstrated normal external genitalia. Cervix is noted to be long and closed. 3mm cervical polyp noted on anterior lip of the cervix. Prolene suture used for Smyth cerclage. Knot tied at the 12 o clock position. Excellent hemostasis obtained. FHR x2 post op.

## 2024-01-26 NOTE — DISCHARGE NOTE ANTEPARTUM - NS MD DC FALL RISK RISK
For information on Fall & Injury Prevention, visit: [email or web address] OR  [email or web address] OR  https://www.cdc.gov/steadi/patient.html

## 2024-01-26 NOTE — OB RN INTRAOPERATIVE NOTE - NSOBSELHIDDEN_OBGYN_ALL_OB_FT
[NSOBAttendingProcedure1_OBGYN_ALL_OB_FT:[id number]],[NS2ndAttendingProcedure1_OBGYN_ALL_OB_FT:[id number]],[NSRNCirculatorProcedure1_OBGYN_ALL_OB_FT:[id number]]

## 2024-01-26 NOTE — BRIEF OPERATIVE NOTE - NSICDXBRIEFPOSTOP_GEN_ALL_CORE_FT
POST-OP DIAGNOSIS:  Cervical insufficiency in pregnancy, antepartum, second trimester 26-Jan-2024 14:46:18  Lisa Ramirez

## 2024-01-26 NOTE — PROGRESS NOTE ADULT - SUBJECTIVE AND OBJECTIVE BOX
Patient seen and examined at bedside, no acute overnight events. No acute complaints. Pt reports +FM, denies LOF, VB, ctx. NPO since MN.     Vital Signs Last 24 Hours  T(C): 36.8 (01-26-24 @ 05:35), Max: 36.8 (01-26-24 @ 05:35)  HR: 91 (01-26-24 @ 05:35) (87 - 91)  BP: 105/71 (01-26-24 @ 05:35) (104/68 - 105/71)  RR: 18 (01-26-24 @ 05:35) (18 - 18)  SpO2: 99% (01-26-24 @ 05:35) (98% - 99%)    CAPILLARY BLOOD GLUCOSE      Physical Exam:  General: NAD  Abdomen: Soft, non-tender, gravid  Ext: No pain or swelling      Labs:             9.3    9.34  )-----------( 384      ( 01-26 @ 00:48 )             31.4     01-26 @ 00:48    136  |  103  |  5   ----------------------------<  94  3.5   |  19  |  0.50    Ca    9.0      01-26 @ 00:48    TPro  6.7  /  Alb  3.4  /  TBili  0.2  /  DBili  x   /  AST  11  /  ALT  6   /  AlkPhos  81  01-26 @ 00:48      PTT - ( 01-26 @ 00:48 )  PTT:28.4 sec    Uric Acid: (01-26 @ 00:48)  3.9      Fibrinogen: (01-26 @ 00:48)  --       LDH: (01-26 @ 00:48)  --         MEDICATIONS  (STANDING):  lactated ringers. 1000 milliLiter(s) (125 mL/Hr) IV Continuous <Continuous>    MEDICATIONS  (PRN):

## 2024-01-26 NOTE — BRIEF OPERATIVE NOTE - NSICDXBRIEFPROCEDURE_GEN_ALL_CORE_FT
PROCEDURES:  Libra cerclage of cervix during pregnancy by vaginal approach 26-Jan-2024 14:45:48  Lisa Ramirez

## 2024-01-26 NOTE — DISCHARGE NOTE ANTEPARTUM - CARE PLAN
Principal Discharge DX:	Cervical insufficiency in pregnancy in second trimester, antepartum  Assessment and plan of treatment:	Resume normal prenatal care. Please call your doctor with any questions or concerns. Please report back to the hospital if you experience fevers, chills, nausea, vomiting, painful contractions, vaginal bleeding, loss of fluid or if you experience decreased fetal movement.   1

## 2024-01-26 NOTE — DISCHARGE NOTE ANTEPARTUM - CARE PROVIDER_API CALL
Tona Fulton  Obstetrics and Gynecology  [address] [address]  Phone: [phone]  Fax: [phone]  Follow Up Time:

## 2024-01-26 NOTE — OB RN PATIENT PROFILE - FALL HARM RISK - TYPE OF ASSESSMENT
"Brady presented to nurse room at OSH at 1020. Informed RN that he had fallen outside while walking in the city yesterday evening around 8pm. He reported hitting the sidewalk. His right collar bone and right wrist are swollen and he reports pain \"8/10 with movement\". He is also reporting pain \"8/10 near his right ribs with coughing\" He states that he has mild pain \"3/10\" on his right upper thigh.    VS /80 P73 RR18 T97.9 oral O2 98%  No discoloration or bruising noted on collar bone or wrist, radial pulse was strong. Did not assess or look at Brady's right thigh.     Encouraged Brady to seek care at Urgent care. He did not feel comfortable driving there himself due to the pain during movement. Cab service was ordered to come get him from OSH. ETA 4min. Brady was given information on how to order cab for a ride home when done at Urgent Care. Brady independently left RN office to get his stuff and jacket.  " Admission

## 2024-01-29 ENCOUNTER — LABORATORY RESULT (OUTPATIENT)
Age: 39
End: 2024-01-29

## 2024-01-29 ENCOUNTER — ASOB RESULT (OUTPATIENT)
Age: 39
End: 2024-01-29

## 2024-01-29 ENCOUNTER — APPOINTMENT (OUTPATIENT)
Dept: ANTEPARTUM | Facility: CLINIC | Age: 39
End: 2024-01-29
Payer: COMMERCIAL

## 2024-01-29 PROCEDURE — 76815 OB US LIMITED FETUS(S): CPT

## 2024-01-29 PROCEDURE — 76817 TRANSVAGINAL US OBSTETRIC: CPT

## 2024-02-05 ENCOUNTER — NON-APPOINTMENT (OUTPATIENT)
Age: 39
End: 2024-02-05

## 2024-02-05 ENCOUNTER — APPOINTMENT (OUTPATIENT)
Dept: OBGYN | Facility: CLINIC | Age: 39
End: 2024-02-05
Payer: COMMERCIAL

## 2024-02-05 VITALS — SYSTOLIC BLOOD PRESSURE: 118 MMHG | DIASTOLIC BLOOD PRESSURE: 50 MMHG | BODY MASS INDEX: 27.62 KG/M2 | WEIGHT: 151 LBS

## 2024-02-05 PROCEDURE — 81003 URINALYSIS AUTO W/O SCOPE: CPT | Mod: QW

## 2024-02-05 PROCEDURE — 0502F SUBSEQUENT PRENATAL CARE: CPT

## 2024-02-08 LAB
ALBUMIN SERPL ELPH-MCNC: 3.5 G/DL
ALP BLD-CCNC: 97 U/L
ALT SERPL-CCNC: 8 U/L
ANION GAP SERPL CALC-SCNC: 12 MMOL/L
APTT BLD: 29.4 SEC
AST SERPL-CCNC: 11 U/L
BASOPHILS # BLD AUTO: 0.06 K/UL
BASOPHILS NFR BLD AUTO: 0.6 %
BILIRUB SERPL-MCNC: 0.2 MG/DL
BUN SERPL-MCNC: 4 MG/DL
CALCIUM SERPL-MCNC: 9.1 MG/DL
CHLORIDE SERPL-SCNC: 104 MMOL/L
CO2 SERPL-SCNC: 22 MMOL/L
CREAT SERPL-MCNC: 0.5 MG/DL
CREAT SPEC-SCNC: 164 MG/DL
CREAT/PROT UR: 0.1 RATIO
EGFR: 122 ML/MIN/1.73M2
EOSINOPHIL # BLD AUTO: 0.12 K/UL
EOSINOPHIL NFR BLD AUTO: 1.2 %
FIBRINOGEN AG PPP IA-MCNC: 644 MG/DL
FOLATE SERPL-MCNC: >20 NG/ML
GLUCOSE SERPL-MCNC: 85 MG/DL
HCT VFR BLD CALC: 28.9 %
HGB BLD-MCNC: 9.2 G/DL
IMM GRANULOCYTES NFR BLD AUTO: 0.8 %
IRON SATN MFR SERPL: 5 %
IRON SERPL-MCNC: 29 UG/DL
LDH SERPL-CCNC: 155 U/L
LYMPHOCYTES # BLD AUTO: 2.16 K/UL
LYMPHOCYTES NFR BLD AUTO: 20.9 %
MAN DIFF?: NORMAL
MCHC RBC-ENTMCNC: 25.5 PG
MCHC RBC-ENTMCNC: 31.8 GM/DL
MCV RBC AUTO: 80.1 FL
MONOCYTES # BLD AUTO: 0.78 K/UL
MONOCYTES NFR BLD AUTO: 7.6 %
NEUTROPHILS # BLD AUTO: 7.12 K/UL
NEUTROPHILS NFR BLD AUTO: 68.9 %
PLATELET # BLD AUTO: 395 K/UL
POTASSIUM SERPL-SCNC: 4.6 MMOL/L
PROT SERPL-MCNC: 6.5 G/DL
PROT UR-MCNC: 16 MG/DL
RBC # BLD: 3.61 M/UL
RBC # FLD: 14.3 %
SODIUM SERPL-SCNC: 138 MMOL/L
TIBC SERPL-MCNC: 573 UG/DL
UIBC SERPL-MCNC: 544 UG/DL
URATE SERPL-MCNC: 4.6 MG/DL
VIT B12 SERPL-MCNC: 469 PG/ML
WBC # FLD AUTO: 10.32 K/UL

## 2024-02-13 ENCOUNTER — APPOINTMENT (OUTPATIENT)
Dept: ANTEPARTUM | Facility: CLINIC | Age: 39
End: 2024-02-13

## 2024-02-14 ENCOUNTER — APPOINTMENT (OUTPATIENT)
Dept: ANTEPARTUM | Facility: CLINIC | Age: 39
End: 2024-02-14
Payer: COMMERCIAL

## 2024-02-14 ENCOUNTER — ASOB RESULT (OUTPATIENT)
Age: 39
End: 2024-02-14

## 2024-02-14 PROCEDURE — 76812 OB US DETAILED ADDL FETUS: CPT

## 2024-02-14 PROCEDURE — 76811 OB US DETAILED SNGL FETUS: CPT

## 2024-02-14 PROCEDURE — 76817 TRANSVAGINAL US OBSTETRIC: CPT

## 2024-02-15 ENCOUNTER — NON-APPOINTMENT (OUTPATIENT)
Age: 39
End: 2024-02-15

## 2024-02-20 ENCOUNTER — NON-APPOINTMENT (OUTPATIENT)
Age: 39
End: 2024-02-20

## 2024-02-21 ENCOUNTER — NON-APPOINTMENT (OUTPATIENT)
Age: 39
End: 2024-02-21

## 2024-02-21 DIAGNOSIS — R77.2 ABNORMALITY OF ALPHAFETOPROTEIN: ICD-10-CM

## 2024-02-22 ENCOUNTER — NON-APPOINTMENT (OUTPATIENT)
Age: 39
End: 2024-02-22

## 2024-02-26 LAB
AFP MOM: 5.48
AFP VALUE: 296.9 NG/ML
ALBUMIN SERPL ELPH-MCNC: 3.2 G/DL
ALP BLD-CCNC: 96 U/L
ALPHA FETOPROTEIN SERUM COMMENT: NORMAL
ALPHA FETOPROTEIN SERUM INTERPRETATION: NORMAL
ALPHA FETOPROTEIN SERUM RESULTS: NORMAL
ALPHA FETOPROTEIN SERUM TEST RESULTS: ABNORMAL
ALT SERPL-CCNC: 6 U/L
ANION GAP SERPL CALC-SCNC: 13 MMOL/L
AST SERPL-CCNC: 14 U/L
BILIRUB SERPL-MCNC: 0.2 MG/DL
BUN SERPL-MCNC: 6 MG/DL
CALCIUM SERPL-MCNC: 8.8 MG/DL
CHLORIDE SERPL-SCNC: 103 MMOL/L
CO2 SERPL-SCNC: 22 MMOL/L
CREAT SERPL-MCNC: 0.48 MG/DL
EGFR: 123 ML/MIN/1.73M2
GESTATIONAL AGE BASED ON: NORMAL
GESTATIONAL AGE ON COLLECTION DATE: 19 WEEKS
GLUCOSE SERPL-MCNC: 82 MG/DL
HCT VFR BLD CALC: 28.9 %
HGB BLD-MCNC: 8.7 G/DL
INSULIN DEP DIABETES: NO
IRON SATN MFR SERPL: 5 %
IRON SERPL-MCNC: 28 UG/DL
MATERNAL AGE AT EDD AFP: 41.1 YR
MCHC RBC-ENTMCNC: 25.1 PG
MCHC RBC-ENTMCNC: 30.1 GM/DL
MCV RBC AUTO: 83.3 FL
MULTIPLE GESTATION: NORMAL
OSBR RISK 1 IN: 56
PLATELET # BLD AUTO: 365 K/UL
POTASSIUM SERPL-SCNC: 4.7 MMOL/L
PROT SERPL-MCNC: 5.8 G/DL
RACE: NORMAL
RBC # BLD: 3.47 M/UL
RBC # FLD: 16.8 %
SODIUM SERPL-SCNC: 138 MMOL/L
T3FREE SERPL-MCNC: 2.28 PG/ML
T4 FREE SERPL-MCNC: 0.9 NG/DL
TIBC SERPL-MCNC: 547 UG/DL
TSH SERPL-ACNC: 1.59 UIU/ML
UIBC SERPL-MCNC: 519 UG/DL
WBC # FLD AUTO: 10.67 K/UL
WEIGHT AFP: 134 LBS

## 2024-02-28 ENCOUNTER — APPOINTMENT (OUTPATIENT)
Dept: ANTEPARTUM | Facility: CLINIC | Age: 39
End: 2024-02-28
Payer: COMMERCIAL

## 2024-02-28 ENCOUNTER — ASOB RESULT (OUTPATIENT)
Age: 39
End: 2024-02-28

## 2024-02-28 ENCOUNTER — APPOINTMENT (OUTPATIENT)
Dept: MATERNAL FETAL MEDICINE | Facility: CLINIC | Age: 39
End: 2024-02-28

## 2024-02-28 PROCEDURE — 76815 OB US LIMITED FETUS(S): CPT

## 2024-02-28 PROCEDURE — 76817 TRANSVAGINAL US OBSTETRIC: CPT

## 2024-03-08 ENCOUNTER — APPOINTMENT (OUTPATIENT)
Dept: OBGYN | Facility: CLINIC | Age: 39
End: 2024-03-08
Payer: COMMERCIAL

## 2024-03-08 VITALS — WEIGHT: 163 LBS | DIASTOLIC BLOOD PRESSURE: 79 MMHG | SYSTOLIC BLOOD PRESSURE: 131 MMHG | BODY MASS INDEX: 29.81 KG/M2

## 2024-03-08 PROCEDURE — 0502F SUBSEQUENT PRENATAL CARE: CPT

## 2024-03-13 ENCOUNTER — APPOINTMENT (OUTPATIENT)
Dept: ANTEPARTUM | Facility: CLINIC | Age: 39
End: 2024-03-13
Payer: COMMERCIAL

## 2024-03-13 ENCOUNTER — ASOB RESULT (OUTPATIENT)
Age: 39
End: 2024-03-13

## 2024-03-13 PROCEDURE — 76816 OB US FOLLOW-UP PER FETUS: CPT | Mod: 59

## 2024-04-08 ENCOUNTER — NON-APPOINTMENT (OUTPATIENT)
Age: 39
End: 2024-04-08

## 2024-04-08 ENCOUNTER — APPOINTMENT (OUTPATIENT)
Dept: ANTEPARTUM | Facility: CLINIC | Age: 39
End: 2024-04-08
Payer: COMMERCIAL

## 2024-04-08 ENCOUNTER — APPOINTMENT (OUTPATIENT)
Dept: OBGYN | Facility: CLINIC | Age: 39
End: 2024-04-08
Payer: COMMERCIAL

## 2024-04-08 ENCOUNTER — ASOB RESULT (OUTPATIENT)
Age: 39
End: 2024-04-08

## 2024-04-08 ENCOUNTER — LABORATORY RESULT (OUTPATIENT)
Age: 39
End: 2024-04-08

## 2024-04-08 VITALS — DIASTOLIC BLOOD PRESSURE: 74 MMHG | BODY MASS INDEX: 30.73 KG/M2 | SYSTOLIC BLOOD PRESSURE: 111 MMHG | WEIGHT: 168 LBS

## 2024-04-08 DIAGNOSIS — O30.049 TWIN PREGNANCY, DICHORIONIC/DIAMNIOTIC, UNSPECIFIED TRIMESTER: ICD-10-CM

## 2024-04-08 PROCEDURE — 76819 FETAL BIOPHYS PROFIL W/O NST: CPT | Mod: 59

## 2024-04-08 PROCEDURE — 76816 OB US FOLLOW-UP PER FETUS: CPT | Mod: 59

## 2024-04-08 PROCEDURE — 0502F SUBSEQUENT PRENATAL CARE: CPT

## 2024-04-08 PROCEDURE — 59426 ANTEPARTUM CARE ONLY: CPT

## 2024-04-09 LAB
ALBUMIN SERPL ELPH-MCNC: 3.5 G/DL
ALP BLD-CCNC: 115 U/L
ALT SERPL-CCNC: 9 U/L
ANION GAP SERPL CALC-SCNC: 14 MMOL/L
AST SERPL-CCNC: 13 U/L
BASOPHILS # BLD AUTO: 0.07 K/UL
BASOPHILS NFR BLD AUTO: 0.9 %
BILIRUB SERPL-MCNC: <0.2 MG/DL
BUN SERPL-MCNC: 4 MG/DL
CALCIUM SERPL-MCNC: 8.9 MG/DL
CHLORIDE SERPL-SCNC: 106 MMOL/L
CO2 SERPL-SCNC: 20 MMOL/L
CREAT SERPL-MCNC: 0.53 MG/DL
EGFR: 120 ML/MIN/1.73M2
EOSINOPHIL # BLD AUTO: 0.07 K/UL
EOSINOPHIL NFR BLD AUTO: 0.9 %
GLUCOSE 1H P 50 G GLC PO SERPL-MCNC: 127 MG/DL
GLUCOSE SERPL-MCNC: 134 MG/DL
HCT VFR BLD CALC: 36.4 %
HGB BLD-MCNC: 11.4 G/DL
HIV1+2 AB SPEC QL IA.RAPID: NONREACTIVE
IRON SATN MFR SERPL: 37 %
IRON SERPL-MCNC: 126 UG/DL
LYMPHOCYTES # BLD AUTO: 2.4 K/UL
LYMPHOCYTES NFR BLD AUTO: 29.5 %
MAN DIFF?: NORMAL
MCHC RBC-ENTMCNC: 27.5 PG
MCHC RBC-ENTMCNC: 31.3 GM/DL
MCV RBC AUTO: 87.7 FL
MONOCYTES # BLD AUTO: 0.36 K/UL
MONOCYTES NFR BLD AUTO: 4.4 %
NEUTROPHILS # BLD AUTO: 5.16 K/UL
NEUTROPHILS NFR BLD AUTO: 63.4 %
PLATELET # BLD AUTO: 275 K/UL
POTASSIUM SERPL-SCNC: 4.6 MMOL/L
PROT SERPL-MCNC: 5.9 G/DL
RBC # BLD: 4.15 M/UL
RBC # FLD: 24 %
SODIUM SERPL-SCNC: 139 MMOL/L
T PALLIDUM AB SER QL IA: NEGATIVE
TIBC SERPL-MCNC: 345 UG/DL
UIBC SERPL-MCNC: 218 UG/DL
WBC # FLD AUTO: 8.14 K/UL

## 2024-04-15 ENCOUNTER — APPOINTMENT (OUTPATIENT)
Dept: ANTEPARTUM | Facility: CLINIC | Age: 39
End: 2024-04-15
Payer: COMMERCIAL

## 2024-04-15 ENCOUNTER — ASOB RESULT (OUTPATIENT)
Age: 39
End: 2024-04-15

## 2024-04-15 PROCEDURE — 76816 OB US FOLLOW-UP PER FETUS: CPT | Mod: 59

## 2024-04-15 PROCEDURE — 76819 FETAL BIOPHYS PROFIL W/O NST: CPT

## 2024-04-24 ENCOUNTER — ASOB RESULT (OUTPATIENT)
Age: 39
End: 2024-04-24

## 2024-04-24 ENCOUNTER — APPOINTMENT (OUTPATIENT)
Dept: ANTEPARTUM | Facility: CLINIC | Age: 39
End: 2024-04-24
Payer: COMMERCIAL

## 2024-04-24 PROCEDURE — 76819 FETAL BIOPHYS PROFIL W/O NST: CPT | Mod: 59

## 2024-04-30 ENCOUNTER — APPOINTMENT (OUTPATIENT)
Dept: ANTEPARTUM | Facility: CLINIC | Age: 39
End: 2024-04-30
Payer: COMMERCIAL

## 2024-04-30 ENCOUNTER — ASOB RESULT (OUTPATIENT)
Age: 39
End: 2024-04-30

## 2024-04-30 ENCOUNTER — APPOINTMENT (OUTPATIENT)
Dept: ANTEPARTUM | Facility: CLINIC | Age: 39
End: 2024-04-30

## 2024-04-30 VITALS
DIASTOLIC BLOOD PRESSURE: 74 MMHG | SYSTOLIC BLOOD PRESSURE: 105 MMHG | HEIGHT: 62 IN | OXYGEN SATURATION: 98 % | BODY MASS INDEX: 30.91 KG/M2 | HEART RATE: 89 BPM | WEIGHT: 168 LBS

## 2024-04-30 PROCEDURE — 76818 FETAL BIOPHYS PROFILE W/NST: CPT | Mod: 59

## 2024-05-02 ENCOUNTER — NON-APPOINTMENT (OUTPATIENT)
Age: 39
End: 2024-05-02

## 2024-05-05 ENCOUNTER — TRANSCRIPTION ENCOUNTER (OUTPATIENT)
Age: 39
End: 2024-05-05

## 2024-05-06 ENCOUNTER — NON-APPOINTMENT (OUTPATIENT)
Age: 39
End: 2024-05-06

## 2024-05-06 ENCOUNTER — APPOINTMENT (OUTPATIENT)
Dept: OBGYN | Facility: CLINIC | Age: 39
End: 2024-05-06

## 2024-05-06 ENCOUNTER — INPATIENT (INPATIENT)
Facility: HOSPITAL | Age: 39
LOS: 3 days | Discharge: HOME | DRG: 951 | End: 2024-05-10
Attending: OBSTETRICS & GYNECOLOGY | Admitting: OBSTETRICS & GYNECOLOGY
Payer: COMMERCIAL

## 2024-05-06 VITALS — DIASTOLIC BLOOD PRESSURE: 73 MMHG | SYSTOLIC BLOOD PRESSURE: 125 MMHG | RESPIRATION RATE: 18 BRPM

## 2024-05-06 DIAGNOSIS — O26.899 OTHER SPECIFIED PREGNANCY RELATED CONDITIONS, UNSPECIFIED TRIMESTER: ICD-10-CM

## 2024-05-06 DIAGNOSIS — Z98.890 OTHER SPECIFIED POSTPROCEDURAL STATES: Chronic | ICD-10-CM

## 2024-05-06 DIAGNOSIS — Z34.80 ENCOUNTER FOR SUPERVISION OF OTHER NORMAL PREGNANCY, UNSPECIFIED TRIMESTER: ICD-10-CM

## 2024-05-06 LAB
ANISOCYTOSIS BLD QL: SLIGHT — SIGNIFICANT CHANGE UP
BASOPHILS # BLD AUTO: 0 K/UL — SIGNIFICANT CHANGE UP (ref 0–0.2)
BASOPHILS NFR BLD AUTO: 0 % — SIGNIFICANT CHANGE UP (ref 0–2)
BLD GP AB SCN SERPL QL: NEGATIVE — SIGNIFICANT CHANGE UP
EOSINOPHIL # BLD AUTO: 0 K/UL — SIGNIFICANT CHANGE UP (ref 0–0.5)
EOSINOPHIL NFR BLD AUTO: 0 % — SIGNIFICANT CHANGE UP (ref 0–6)
GIANT PLATELETS BLD QL SMEAR: PRESENT — SIGNIFICANT CHANGE UP
HCT VFR BLD CALC: 33.9 % — LOW (ref 34.5–45)
HCT VFR BLD CALC: 39.1 % — SIGNIFICANT CHANGE UP (ref 34.5–45)
HGB BLD-MCNC: 11.6 G/DL — SIGNIFICANT CHANGE UP (ref 11.5–15.5)
HGB BLD-MCNC: 12.6 G/DL — SIGNIFICANT CHANGE UP (ref 11.5–15.5)
LYMPHOCYTES # BLD AUTO: 1.49 K/UL — SIGNIFICANT CHANGE UP (ref 1–3.3)
LYMPHOCYTES # BLD AUTO: 15.2 % — SIGNIFICANT CHANGE UP (ref 13–44)
MANUAL SMEAR VERIFICATION: SIGNIFICANT CHANGE UP
MCHC RBC-ENTMCNC: 28 PG — SIGNIFICANT CHANGE UP (ref 27–34)
MCHC RBC-ENTMCNC: 28.9 PG — SIGNIFICANT CHANGE UP (ref 27–34)
MCHC RBC-ENTMCNC: 32.2 GM/DL — SIGNIFICANT CHANGE UP (ref 32–36)
MCHC RBC-ENTMCNC: 34.2 GM/DL — SIGNIFICANT CHANGE UP (ref 32–36)
MCV RBC AUTO: 84.5 FL — SIGNIFICANT CHANGE UP (ref 80–100)
MCV RBC AUTO: 86.9 FL — SIGNIFICANT CHANGE UP (ref 80–100)
MONOCYTES # BLD AUTO: 0.43 K/UL — SIGNIFICANT CHANGE UP (ref 0–0.9)
MONOCYTES NFR BLD AUTO: 4.4 % — SIGNIFICANT CHANGE UP (ref 2–14)
MYELOCYTES NFR BLD: 1.8 % — HIGH (ref 0–0)
NEUTROPHILS # BLD AUTO: 7.7 K/UL — HIGH (ref 1.8–7.4)
NEUTROPHILS NFR BLD AUTO: 77.7 % — HIGH (ref 43–77)
NEUTS BAND # BLD: 0.9 % — SIGNIFICANT CHANGE UP (ref 0–8)
NRBC # BLD: 0 /100 WBCS — SIGNIFICANT CHANGE UP (ref 0–0)
PLAT MORPH BLD: NORMAL — SIGNIFICANT CHANGE UP
PLATELET # BLD AUTO: 264 K/UL — SIGNIFICANT CHANGE UP (ref 150–400)
PLATELET # BLD AUTO: 271 K/UL — SIGNIFICANT CHANGE UP (ref 150–400)
POIKILOCYTOSIS BLD QL AUTO: SLIGHT — SIGNIFICANT CHANGE UP
RBC # BLD: 4.01 M/UL — SIGNIFICANT CHANGE UP (ref 3.8–5.2)
RBC # BLD: 4.5 M/UL — SIGNIFICANT CHANGE UP (ref 3.8–5.2)
RBC # FLD: 19.9 % — HIGH (ref 10.3–14.5)
RBC # FLD: 20.3 % — HIGH (ref 10.3–14.5)
RBC BLD AUTO: ABNORMAL
RH IG SCN BLD-IMP: NEGATIVE — SIGNIFICANT CHANGE UP
WBC # BLD: 18.41 K/UL — HIGH (ref 3.8–10.5)
WBC # BLD: 9.8 K/UL — SIGNIFICANT CHANGE UP (ref 3.8–10.5)
WBC # FLD AUTO: 18.41 K/UL — HIGH (ref 3.8–10.5)
WBC # FLD AUTO: 9.8 K/UL — SIGNIFICANT CHANGE UP (ref 3.8–10.5)

## 2024-05-06 PROCEDURE — 59514 CESAREAN DELIVERY ONLY: CPT | Mod: U7

## 2024-05-06 PROCEDURE — 88307 TISSUE EXAM BY PATHOLOGIST: CPT | Mod: 26

## 2024-05-06 PROCEDURE — 88305 TISSUE EXAM BY PATHOLOGIST: CPT | Mod: 26

## 2024-05-06 DEVICE — SURGICEL SNOW 2 X 4": Type: IMPLANTABLE DEVICE | Status: FUNCTIONAL

## 2024-05-06 RX ORDER — AMPICILLIN TRIHYDRATE 250 MG
1 CAPSULE ORAL EVERY 4 HOURS
Refills: 0 | Status: DISCONTINUED | OUTPATIENT
Start: 2024-05-06 | End: 2024-05-06

## 2024-05-06 RX ORDER — SIMETHICONE 80 MG/1
80 TABLET, CHEWABLE ORAL EVERY 4 HOURS
Refills: 0 | Status: DISCONTINUED | OUTPATIENT
Start: 2024-05-06 | End: 2024-05-10

## 2024-05-06 RX ORDER — NALOXONE HYDROCHLORIDE 4 MG/.1ML
0.1 SPRAY NASAL
Refills: 0 | Status: DISCONTINUED | OUTPATIENT
Start: 2024-05-06 | End: 2024-05-07

## 2024-05-06 RX ORDER — AMPICILLIN TRIHYDRATE 250 MG
2 CAPSULE ORAL ONCE
Refills: 0 | Status: DISCONTINUED | OUTPATIENT
Start: 2024-05-06 | End: 2024-05-06

## 2024-05-06 RX ORDER — ACETAMINOPHEN 500 MG
975 TABLET ORAL
Refills: 0 | Status: DISCONTINUED | OUTPATIENT
Start: 2024-05-06 | End: 2024-05-10

## 2024-05-06 RX ORDER — MORPHINE SULFATE 50 MG/1
4 CAPSULE, EXTENDED RELEASE ORAL ONCE
Refills: 0 | Status: COMPLETED | OUTPATIENT
Start: 2024-05-06 | End: 2024-05-06

## 2024-05-06 RX ORDER — SODIUM CHLORIDE 9 MG/ML
1000 INJECTION, SOLUTION INTRAVENOUS
Refills: 0 | Status: DISCONTINUED | OUTPATIENT
Start: 2024-05-06 | End: 2024-05-06

## 2024-05-06 RX ORDER — SODIUM CHLORIDE 9 MG/ML
1000 INJECTION, SOLUTION INTRAVENOUS
Refills: 0 | Status: DISCONTINUED | OUTPATIENT
Start: 2024-05-06 | End: 2024-05-10

## 2024-05-06 RX ORDER — TETANUS TOXOID, REDUCED DIPHTHERIA TOXOID AND ACELLULAR PERTUSSIS VACCINE, ADSORBED 5; 2.5; 8; 8; 2.5 [IU]/.5ML; [IU]/.5ML; UG/.5ML; UG/.5ML; UG/.5ML
0.5 SUSPENSION INTRAMUSCULAR ONCE
Refills: 0 | Status: DISCONTINUED | OUTPATIENT
Start: 2024-05-06 | End: 2024-05-10

## 2024-05-06 RX ORDER — MAGNESIUM HYDROXIDE 400 MG/1
30 TABLET, CHEWABLE ORAL
Refills: 0 | Status: DISCONTINUED | OUTPATIENT
Start: 2024-05-06 | End: 2024-05-10

## 2024-05-06 RX ORDER — OXYCODONE HYDROCHLORIDE 5 MG/1
5 TABLET ORAL
Refills: 0 | Status: COMPLETED | OUTPATIENT
Start: 2024-05-06 | End: 2024-05-13

## 2024-05-06 RX ORDER — OXYCODONE HYDROCHLORIDE 5 MG/1
5 TABLET ORAL
Refills: 0 | Status: DISCONTINUED | OUTPATIENT
Start: 2024-05-06 | End: 2024-05-07

## 2024-05-06 RX ORDER — OXYTOCIN 10 UNIT/ML
333.33 VIAL (ML) INJECTION
Qty: 20 | Refills: 0 | Status: DISCONTINUED | OUTPATIENT
Start: 2024-05-06 | End: 2024-05-10

## 2024-05-06 RX ORDER — CHLORHEXIDINE GLUCONATE 213 G/1000ML
1 SOLUTION TOPICAL DAILY
Refills: 0 | Status: DISCONTINUED | OUTPATIENT
Start: 2024-05-06 | End: 2024-05-06

## 2024-05-06 RX ORDER — KETOROLAC TROMETHAMINE 30 MG/ML
30 SYRINGE (ML) INJECTION EVERY 6 HOURS
Refills: 0 | Status: COMPLETED | OUTPATIENT
Start: 2024-05-06 | End: 2024-05-07

## 2024-05-06 RX ORDER — HEPARIN SODIUM 5000 [USP'U]/ML
5000 INJECTION INTRAVENOUS; SUBCUTANEOUS EVERY 12 HOURS
Refills: 0 | Status: DISCONTINUED | OUTPATIENT
Start: 2024-05-06 | End: 2024-05-10

## 2024-05-06 RX ORDER — ONDANSETRON 8 MG/1
4 TABLET, FILM COATED ORAL EVERY 6 HOURS
Refills: 0 | Status: DISCONTINUED | OUTPATIENT
Start: 2024-05-06 | End: 2024-05-07

## 2024-05-06 RX ORDER — DEXAMETHASONE 0.5 MG/5ML
4 ELIXIR ORAL EVERY 6 HOURS
Refills: 0 | Status: DISCONTINUED | OUTPATIENT
Start: 2024-05-06 | End: 2024-05-07

## 2024-05-06 RX ORDER — IBUPROFEN 200 MG
600 TABLET ORAL EVERY 6 HOURS
Refills: 0 | Status: COMPLETED | OUTPATIENT
Start: 2024-05-06 | End: 2025-04-04

## 2024-05-06 RX ORDER — CITRIC ACID/SODIUM CITRATE 300-500 MG
30 SOLUTION, ORAL ORAL ONCE
Refills: 0 | Status: DISCONTINUED | OUTPATIENT
Start: 2024-05-06 | End: 2024-05-06

## 2024-05-06 RX ORDER — OXYCODONE HYDROCHLORIDE 5 MG/1
5 TABLET ORAL ONCE
Refills: 0 | Status: DISCONTINUED | OUTPATIENT
Start: 2024-05-06 | End: 2024-05-10

## 2024-05-06 RX ORDER — MORPHINE SULFATE 50 MG/1
2 CAPSULE, EXTENDED RELEASE ORAL ONCE
Refills: 0 | Status: DISCONTINUED | OUTPATIENT
Start: 2024-05-06 | End: 2024-05-07

## 2024-05-06 RX ORDER — NALBUPHINE HYDROCHLORIDE 10 MG/ML
2.5 INJECTION, SOLUTION INTRAMUSCULAR; INTRAVENOUS; SUBCUTANEOUS EVERY 6 HOURS
Refills: 0 | Status: DISCONTINUED | OUTPATIENT
Start: 2024-05-06 | End: 2024-05-07

## 2024-05-06 RX ORDER — ONDANSETRON 8 MG/1
4 TABLET, FILM COATED ORAL ONCE
Refills: 0 | Status: COMPLETED | OUTPATIENT
Start: 2024-05-06 | End: 2024-05-06

## 2024-05-06 RX ORDER — FAMOTIDINE 10 MG/ML
20 INJECTION INTRAVENOUS ONCE
Refills: 0 | Status: COMPLETED | OUTPATIENT
Start: 2024-05-06 | End: 2024-05-06

## 2024-05-06 RX ORDER — DIPHENHYDRAMINE HCL 50 MG
25 CAPSULE ORAL EVERY 6 HOURS
Refills: 0 | Status: COMPLETED | OUTPATIENT
Start: 2024-05-06 | End: 2025-04-04

## 2024-05-06 RX ORDER — LANOLIN
1 OINTMENT (GRAM) TOPICAL EVERY 6 HOURS
Refills: 0 | Status: DISCONTINUED | OUTPATIENT
Start: 2024-05-06 | End: 2024-05-10

## 2024-05-06 RX ORDER — ACETAMINOPHEN 500 MG
1000 TABLET ORAL ONCE
Refills: 0 | Status: DISCONTINUED | OUTPATIENT
Start: 2024-05-06 | End: 2024-05-10

## 2024-05-06 RX ORDER — SODIUM CHLORIDE 9 MG/ML
500 INJECTION, SOLUTION INTRAVENOUS ONCE
Refills: 0 | Status: COMPLETED | OUTPATIENT
Start: 2024-05-06 | End: 2024-05-06

## 2024-05-06 RX ADMIN — ONDANSETRON 4 MILLIGRAM(S): 8 TABLET, FILM COATED ORAL at 12:41

## 2024-05-06 RX ADMIN — FAMOTIDINE 20 MILLIGRAM(S): 10 INJECTION INTRAVENOUS at 18:39

## 2024-05-06 RX ADMIN — ONDANSETRON 4 MILLIGRAM(S): 8 TABLET, FILM COATED ORAL at 18:36

## 2024-05-06 RX ADMIN — Medication 975 MILLIGRAM(S): at 20:33

## 2024-05-06 RX ADMIN — Medication 12 MILLIGRAM(S): at 10:46

## 2024-05-06 RX ADMIN — SODIUM CHLORIDE 500 MILLILITER(S): 9 INJECTION, SOLUTION INTRAVENOUS at 15:51

## 2024-05-06 RX ADMIN — OXYCODONE HYDROCHLORIDE 5 MILLIGRAM(S): 5 TABLET ORAL at 21:19

## 2024-05-06 RX ADMIN — Medication 30 MILLIGRAM(S): at 23:13

## 2024-05-06 RX ADMIN — HEPARIN SODIUM 5000 UNIT(S): 5000 INJECTION INTRAVENOUS; SUBCUTANEOUS at 23:14

## 2024-05-06 RX ADMIN — Medication 30 MILLIGRAM(S): at 23:43

## 2024-05-06 RX ADMIN — Medication 975 MILLIGRAM(S): at 20:03

## 2024-05-06 RX ADMIN — OXYCODONE HYDROCHLORIDE 5 MILLIGRAM(S): 5 TABLET ORAL at 20:49

## 2024-05-06 RX ADMIN — SIMETHICONE 80 MILLIGRAM(S): 80 TABLET, CHEWABLE ORAL at 23:16

## 2024-05-06 NOTE — OB PROVIDER DELIVERY SUMMARY - NSPROVIDERDELIVERYNOTE_OBGYN_ALL_OB_FT
unscheduled urgent rLTCS for PTL with h/o prior classical  Baby A: viable female infant, apgars 7/9, weight 1630g   Baby B: viable male infact, apgars 7/8, weight _  Hysterotomy closed in 1 layer using caprosyn  Fibrillar placed over hysterotomy   Grossly normal uterus, tubes, and ovaries  Abdomen closed in standard fashion  Pt and infant to recovery in stable condition  Placenta to pathology  EBL:1739   IVF: 2300    UOP: 400    Amyeo Afroz Jereen, PGY-3 unscheduled urgent rLTCS for PTL with h/o prior classical CS at 21wks   Baby A: viable female infant, apgars 7/9, weight 1580g   Baby B: viable male infact, apgars 7/8, weight 1630g  Hysterotomy closed in 2 layers with 1-0 Vicryl suture and imbricating layer using PDS  Surgisnow placed over hysterotomy   3-4cm uterine rupture noted on left anterior surface of uterus - closed in 2 layers of PDS  Area of denuding/defect noted on posterior surface of uterus closed with interrupted suture    Abdomen closed in standard fashion  Pt and infant to recovery in stable condition  Placenta to pathology  EBL:1739   IVF: 2300    UOP: 400    Amyeo Afroz Jereen, PGY-3 unscheduled urgent rLTCS for PTL with h/o prior classical CS at 21wks   Baby A: viable female infant, apgars 7/9, weight 1580g   Baby B: viable male infact, apgars 7/8, weight 1630g  Hysterotomy closed in 2 layers with 1-0 Vicryl suture and imbricating layer using PDS  Surgisnow placed over hysterotomy   3-4cm uterine rupture noted on left anterior surface of uterus - closed in 2 layers of PDS  Area of denuding/defect noted on posterior surface of uterus closed with interrupted suture    Abdomen closed in standard fashion  Additional pitocin added to IV bag, Methergine IM x 1 dose given   Pt and infant to recovery in stable condition  Placenta to pathology  EBL:1739   IVF: 2300    UOP: 400    Amyeo Afroz Jereen, PGY-3

## 2024-05-06 NOTE — OB RN INTRAOPERATIVE NOTE - NS_INTPNECOMPLACE_OBGYN_ALL_OB
Baby in giraffe bed, top opened and heat off. Vitals stable on room air. Voiding and stooling. Tolerating PO/NG feedings, doing well. Mom called for update, questions answered. Bilateral legs

## 2024-05-06 NOTE — OB PROVIDER LABOR PROGRESS NOTE - ASSESSMENT
pt tolerated exam well    - epidural in place  - VE in 2 hours for ROL Amyeo Afroz Jereen, PGY-3  Seen and d/w Dr Khoury

## 2024-05-06 NOTE — OB RN DELIVERY SUMMARY - NSSELHIDDEN_OBGYN_ALL_OB_FT
[NS_[email or web address]_ALL_OB_FT:[email or web address]=],[NS_DeliveryAssist1_OBGYN_ALL_OB_FT:[id number]=],[NS_DeliveryRN_OBGYN_ALL_OB_FT:[email or web address]=]

## 2024-05-06 NOTE — OB RN PATIENT PROFILE - FALL HARM RISK - UNIVERSAL INTERVENTIONS
Bed in lowest position, wheels locked, appropriate side rails in place/Call bell, personal items and telephone in reach/Instruct patient to call for assistance before getting out of bed or chair/Non-slip footwear when patient is out of bed/[place] to call system/Physically safe environment - no spills, clutter or unnecessary equipment/Purposeful Proactive Rounding/Room/bathroom lighting operational, light cord in reach

## 2024-05-06 NOTE — OB PROVIDER H&P - NSHPPHYSICALEXAM_GEN_ALL_CORE
Gen: appears uncomfortable  Abd: soft, gravid, non-tender  EFH: A- 135/mod/-accels/-decels; discontinuous  B- 140/mod/-accels/-decels; discontinuous  [place]: ctx q2-4min  TAUS: A- vertex, anterior placenta. B- vertex, anterior placenta  VE: 0/0/-3 with cerclage in place, not noted to be on tension

## 2024-05-06 NOTE — OB PROVIDER DELIVERY SUMMARY - OB VTE ASSESSMENT
[facility]NAMARA department of [address], [facility] [address] Janine CARRENO [phone]  F [phone]       Patient Name: Miko Carvalho   Admit Date: 8/1/2022   OR Date: 8/1/2022   Visit Date: 8/3/2022        SUBJECTIVE:  No events overnight. Gas pain yesterday. No nausea. Tolerating regular diet. Feels that she did pass gas last night. Pain well controlled with PO pain medication    OBJECTIVE:    Patient Vitals for the past 24 hrs:   Temp Pulse Resp BP SpO2   08/03/22 0758 98 °F (36.7 °C) 91 16 113/70 99 %   08/03/22 0020 98.8 °F (37.1 °C) (!) 106 16 133/74 99 %   08/02/22 2030 98.2 °F (36.8 °C) (!) 102 16 115/69 100 %   08/02/22 1239 98.7 °F (37.1 °C) (!) 104 16 136/71 99 %         Date 08/02/22 0700 - 08/03/22 0659 08/03/22 0700 - 08/04/22 0659   Shift 8684-4097 7738-6382 24 Hour Total 3945-1525 9102-1142 24 Hour Total   INTAKE   P.O. 640  640        P. O. 640  640      Shift Total(mL/kg) 640(6.9)  640(6.9)      OUTPUT   Urine(mL/kg/hr) 380(0.3)  380(0.2)        Urine Voided 380  380      Shift Total(mL/kg) 380(4.1)  380(4.1)        260      Weight (kg) 93 93 93 93 93 93         Physical Exam     General:  alert, cooperative, no distress     Cardiac:  Regular rate and rhythm        Lungs:  clear to auscultation bilaterally  Abdomen:  soft, non-tender, without masses or organomegaly       Wound:  dressing removed. Incision intact with staples. No active drainage. No erythema. Extremity: extremities normal, atraumatic, no cyanosis or edema    Data Review  Lab Results   Component Value Date/Time    WBC 5.5 08/02/2022 04:11 AM    ABS.  NEUTROPHILS 4.2 08/02/2022 04:11 AM    HGB 9.3 (L) 08/02/2022 04:11 AM    HCT 28.7 (L) 08/02/2022 04:11 AM    MCV 89.1 08/02/2022 04:11 AM    MCH 28.9 08/02/2022 04:11 AM    PLATELET 461 88/77/1724 04:11 AM     Lab Results   Component Value Date/Time    Sodium 138 08/02/2022 04:11 AM    Potassium 3.9 08/02/2022 04:11 AM    Chloride 108 08/02/2022 04:11 AM    CO2 23 08/02/2022 04:11 AM    Glucose 239 (H) 08/02/2022 04:11 AM    BUN 4 (L) 08/02/2022 04:11 AM    Creatinine 0.40 (L) 08/02/2022 04:11 AM    Calcium 7.3 (L) 08/02/2022 04:11 AM    Albumin 3.3 (L) 07/27/2022 02:30 PM    Bilirubin, total 0.3 07/27/2022 02:30 PM    ALT (SGPT) 14 07/27/2022 02:30 PM    Alk. phosphatase 81 07/27/2022 02:30 PM     IMPRESSION/PLAN:    2 Day Post-Op Procedure(s):  EXPLORATORY LAPAROTOMY, RESECTION OF MASS, TOTAL ABDOMINAL HYSTEROSCOPY, BILATERAL SALPINGO-OOPHORECTOMY, OMENTECTOMY, AND PROCTOSCOPY for PELVIC MASS    Oncologic:  34 yo WF who presented to the ER with abdominal pain. She also reported constipation. A CT revealed a large pelvic mass measuring 20.2 x 12.7 x 17.4 cm. The mass filled the cul de sac and extended to the umbilicus. 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 On palpation of colon she was noted to have a very firm lesion of the upper rectum, consistent with a colorectal primary. The colon did not appear obstructed. No other appreciable peritoneal disease. Final pathology pending. Heme/CV:  VSS. Hemodynamically stable   Renal:  Good urine output. Voiding without a problem       FEN/GI:  No nausea. Complains of some gas pain though states that it has improved compared to yesterday. Continue OOB. Tolerating regular diet. Feels that she did pass some gas overnight. ID/Wound:  Afebrile. Abdomen and incision benign. Dressing removed. Patient states that she would feel more comfortable if new dressing was placed on incision. PPX:  Lovenox while admitted. SCDs. Incentive spirometer. Continue OOB. D/C with oral Eliquis x 28 days   Dispostion:  Doing well postoperatively. Continue routine post op care. Pain well controlled with PO pain medication. Tolerating diet. No nausea. Bowel function appearing to return. Will plan on possible discharge later today. Gillian Willson PA-C  8/3/2022  7:45 AM <<--- Click to launch

## 2024-05-06 NOTE — OB PROVIDER DELIVERY SUMMARY - NSSELHIDDEN_OBGYN_ALL_OB_FT
[NS_DeliveryAttending1_OBGYN_ALL_OB_FT:[email or web address]=],[NS_DeliveryAssist1_OBGYN_ALL_OB_FT:[id number]=],[NS_DeliveryRN_OBGYN_ALL_OB_FT:[email or web address]=]

## 2024-05-06 NOTE — OB NEONATOLOGY/PEDIATRICIAN DELIVERY SUMMARY - NSPEDSNEONOTESA_OBGYN_ALL_OB_FT
Requested by OB to attend this  did-di twins delivery at 32weeks for prematurity and  labor . Mother is a 40 year old,  , blood type B   neg.  Prenatal labs as follow: HIV neg, RPR non-reactive, rubella immune, HBsA neg, GBS unknown.  Maternal history significant for fibroids, myomectomy and classical C/S .  This is an IUI pregnancy complicated by twin gestation. ROM at delivery with clear  fluid.  Infant emerged vertex, dusky with intermittent cry. Delayed cord clamping X   17 secs, then brought to warmer. Dried, suctioned and stimulated, CPAP+5 at 30% started at 2 min of life for shallow breathing and color . Apgars  7/9 . Mom wishes to breast feed. Consents to hep B vaccine. Infant admitted to NICU for further management of care on CPAP. Parents updated.

## 2024-05-06 NOTE — OB NEONATOLOGY/PEDIATRICIAN DELIVERY SUMMARY - NSPEDSNEONOTESB_OBGYN_ALL_OB_FT
Requested by OB to attend delivery of 32 0/7 week di/di twins born via repeat c/s for PTL to a 41yo  mother who is B neg, prenatal labs neg/NR/Imm, GBS unknown. This is an IUI pregnancy complicated by twin gestation. Infant delivered cephalic and cried with stimulation. Infant appeared dusky with strong cry and decreased tone. He was dried, suctioned, stimulated, and CPAP 5/30% started at ~3 minutes of life for color and mild retractions and increased to 6/80% to keep preductal O2 sats within target range. Strong cry, pink, active. FiO2 weaned to 70% prior to transfer. Apgars 7/9. Mother wishes to breastfeed and consents to hepatitis b vaccine and circumcision.

## 2024-05-06 NOTE — OB PROVIDER H&P - ASSESSMENT
A/P: Pt is a 39y/o  at 32w presenting to triage with painful ctx since 730am with cerclage in situ. VE 0/0/-3 and cerclage not noted to be on tension, however pt noted to be extremely uncomfortable and requesting intervention for pain. NPO since 9pm .    -Admit to L&D  -NPO  -EFM/[place]  -Routine labs, T&S  -BMZ to be given for fetal lung maturity  -Anesthesia consult for epidural placement  -Plan for cerclage removal followed by repeat evaluation  -Vtx/vtx on sono, however given h/o classical C/S will be for rC/S if pt noted to be in  labor  -2u on hold for multiple gestation/history of PPH  -Consents for cerclage removal, possible repeat C/S, possible hysterectomy signed and in chart    Seen and evaluated with Dr. Khoury and Amyeo Jereen PGY3  VTameya PGY2 A/P: Pt is a 39y/o  at 32w presenting to triage with painful ctx since 730am with cerclage in situ. VE 0/0/-3 and cerclage not noted to be on tension, however pt noted to be extremely uncomfortable and requesting intervention for pain. NPO since 9pm . Plan for cerclage removal followed by repeat evaluation to r/o  labor.    -Admit to L&D  -NPO  -EFM/[place]  -Routine labs, T&S  -BMZ to be given for fetal lung maturity  -Anesthesia consult for epidural placement  -Vtx/vtx on sono, however given h/o classical C/S will be for rC/S if pt noted to be in  labor  -2u on hold for multiple gestation/history of PPH  -Consents for cerclage removal, possible repeat C/S, possible hysterectomy signed and in chart    Seen and evaluated with Dr. Khoury and Amyeo Jereen PGY3  VTameya PGY2

## 2024-05-06 NOTE — CHART NOTE - NSCHARTNOTEFT_GEN_A_CORE
MFM Fellow Event Note    Patient is a 37 yo  with di-di twin pregnancy at 32 weeks, with prior hysterotomy for hemorrhage at 21 weeks in prior pregnancy, cerclage in place, presenting to triage for painful contractions. Contractions persist s/p cerclage removal, SVE /-3 with epidural in place. Betamethasone dose 1 administered this morning. Fetal heart tracings Category 1 for both twins, with periods of loss of contact. OB  Dr. Khoury would like to proceed with delivery.     Agree with delivery via  given painful contractions in setting of prior hysterotomy. Do not need to wait until betamethasone complete due to risk of uterine rupture.     Quinton Portillo MD  D/w Dr. [place]

## 2024-05-06 NOTE — OB PROVIDER H&P - ATTENDING COMMENTS
Patient assessed with resident. Presents with sudden onset abdominal tightening every few minutes beginning at 7:30a. Denies vaginal bleeding or leakage of fluid. + FM x2    history significant for cerclage placement at 17wks and previous classical CD in  for bleeding placenta previa 21+wks.   Past medical and surgical history reviewed. Allergies confirmed.  Vitals reviewed   Physical exam reviewed   Cerclage in place, cervix does not appear dilated on speculum exam   EFM: reactive x2   [place]: regular contractions - moderately palpable   Hb 12.6g/dL [place] neg   A/P: Twin IUP at 32+wks, likely pre-term labor   -admit for cerclage removal and possible delivery   -betamethasone therapy   -continue EFM and toco   -anesthesia for pain control   -NICU consultation   -discussed delivery management and consents signed including infection, bleeding, damage to surrounding organs and possible hysterectomy     JUAN Khoury

## 2024-05-06 NOTE — OB RN PATIENT PROFILE - FUNCTIONAL ASSESSMENT - BASIC MOBILITY PT AGE POP HIDDEN
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     EKG SR RBBB (old per family     1) CAD s/p CABG  -p/w chest pain. EKG non ischemic , trop -sera   - echo with normal lv and moderate AS   - c/w metoprolol   - chest pains  Trop mildly elevated and trending down likley sec to kidney disease,    -1/6 c/o of SOB  ?aspiration NGT in place. CXR Moderate bilateral pleural effusions consider pulm c/s also with anasarca consider IV lasix 40mg daily     2) Covid +  - s/p remdesivir   - c/w supportive management   - t/t per primary team     3) Abd distension   -CTA AP obtained which showed  partially occlusive calcified and non-calcified plaque just distal to take-off of the SMA, with estimated at least 75% luminal narrowing. Limited evaluation of its distal branches. 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   -on Protonix IV q 12              Adult

## 2024-05-06 NOTE — OB RN PATIENT PROFILE - AS SC BRADEN MOBILITY
(4) no limitation Quinolones Counseling:  I discussed with the patient the risks of fluoroquinolones including but not limited to GI upset, allergic reaction, drug rash, diarrhea, dizziness, photosensitivity, yeast infections, liver function test abnormalities, tendonitis/tendon rupture.

## 2024-05-06 NOTE — OB NEONATOLOGY/PEDIATRICIAN DELIVERY SUMMARY - NSNURSERYA_OBGYN_ALL_OB
RX denied as just filled on 11/8/17 for #30 with 3 RFs..........................ChatoRN    
NICU/Special Care Nursery

## 2024-05-06 NOTE — OB RN INTRAOPERATIVE NOTE - NSSELHIDDEN_OBGYN_ALL_OB_FT
[NS_DeliveryAttending1_OBGYN_ALL_OB_FT:[email or web address]=],[NS_DeliveryAssist1_OBGYN_ALL_OB_FT:[id number]=],[NS_DeliveryRN_OBGYN_ALL_OB_FT:[email or web address]=]

## 2024-05-06 NOTE — PRE-ANESTHESIA EVALUATION ADULT - NSANTHOSAYNRD_GEN_A_CORE
No. NELI screening performed.  STOP BANG Legend: 0-2 = LOW Risk; 3-4 = INTERMEDIATE Risk; 5-8 = HIGH Risk
No. NELI screening performed.  STOP BANG Legend: 0-2 = LOW Risk; 3-4 = INTERMEDIATE Risk; 5-8 = HIGH Risk

## 2024-05-06 NOTE — OB PROVIDER H&P - NSMODPPHRISK_OBGYN_A_OB
Over-distended uterus: Multiple gestation, polyhydramnios, Macrosomia/History of previous postpartum hemorrhage/AMA - over 35 years of age

## 2024-05-06 NOTE — CONSULT NOTE PEDS - SUBJECTIVE AND OBJECTIVE BOX
Ms. Calles is a 39 y/o  admitted at 32w0d gestational age. Maternal history notable for anemia and sciatica, and prenatal history notable for pre-viable classical c/s and myomectomy at 21 weeks in  in setting of bleeding previa/PPROM.  NICU consulted to discuss what to expect if she were to deliver at 32 weeks gestation.    I met with Ms. Calles and her partner and we reviewed the followin. The NICU team will be present at her delivery and will immediately assess and care for her infants.    2. Due to prematurity, the infant may require respiratory support, most commonly in the form of CPAP. The outcomes improve after  steroids. Less commonly, some infants at this gestational age will require intubation and mechanical ventilation with surfactant administration.    3. Depending on the clinical status of the infant, enteral feedings may not be started immediately. IV nutrition in the form of TPN would be provided via umbilical line or PICC until the infant is able to tolerate full enteral feedings. Due to immature suck/swallow, she may require an orogastric tube once feeds are initiated. She is also at risk for hypoglycemia.    4. Discussed the benefits of breastfeeding in  infants and encouraged mother to pump following delivery.    5. Due to prematurity, the infant will be at increased risk for infection and would likely be started on antibiotics after birth. The infant will be screened for infections following delivery and may require other courses of antibiotics during their hospital course if an infection were suspected. If the infant shows signs and symptoms of feeding intolerance, feedings may be held, and the infant may require evaluation for intestinal infection (necrotizing enterocolitis).    6. Need for possible blood transfusions, jaundice, phototherapy discussed.    7. The infant is at risk for developmental delays as a consequence of prematurity. The infant will be evaluated by a developmental pediatrician to monitor for neurodevelopmental delays.    8. Thermoregulation issues and need to be in an isolette with slow weaning to a crib discussed.    9. Length of stay is highly variable, but given the infant’s size and gestational age, average stay is approximately 5-6 weeks. Discussed discharged criteria.    Ms. Calles had the chance to ask any questions and was encouraged to contact the NICU at that time if additional questions arise.    Thank you for the opportunity to participate in the care of this patient and please inform us of any changes in her status.

## 2024-05-06 NOTE — OB RN PATIENT PROFILE - NSPROMEDSBROUGHTTOHOSP_GEN_A_NUR
-- DO NOT REPLY / DO NOT REPLY ALL --  -- Message is from Engagement Center Operations ([facility]) --    General Patient Message:     Patient called in stated that he has the G6 DexCom. the pharmacy only open the box to explain the instructions but he never used it and is unopened but wanted to know if he could upgrade it to the G7 DexCom instead and request a call back to assist and discuss .         Caller Information       Type Contact Phone/Fax    03/24/2023 01:02 PM CDT Phone (Incoming) Vinnie Lassiter (Self) [phone] (M)        Alternative phone number: [phone] or [phone] Brothmickey Harrington     Can a detailed message be left? Yes    Message Turnaround: [place]:    Refer to site's KB page for routing instructions    Please give this turnaround time to the caller:   \"You can expect to receive a response 1-3 business days after your provider's clinical team reviews the message\"               no

## 2024-05-07 LAB
BASOPHILS # BLD AUTO: 0.04 K/UL — SIGNIFICANT CHANGE UP (ref 0–0.2)
BASOPHILS # BLD AUTO: 0.06 K/UL — SIGNIFICANT CHANGE UP (ref 0–0.2)
BASOPHILS NFR BLD AUTO: 0.2 % — SIGNIFICANT CHANGE UP (ref 0–2)
BASOPHILS NFR BLD AUTO: 0.3 % — SIGNIFICANT CHANGE UP (ref 0–2)
EOSINOPHIL # BLD AUTO: 0.01 K/UL — SIGNIFICANT CHANGE UP (ref 0–0.5)
EOSINOPHIL # BLD AUTO: 0.01 K/UL — SIGNIFICANT CHANGE UP (ref 0–0.5)
EOSINOPHIL NFR BLD AUTO: 0.1 % — SIGNIFICANT CHANGE UP (ref 0–6)
EOSINOPHIL NFR BLD AUTO: 0.1 % — SIGNIFICANT CHANGE UP (ref 0–6)
HCT VFR BLD CALC: 26.5 % — LOW (ref 34.5–45)
HCT VFR BLD CALC: 27.2 % — LOW (ref 34.5–45)
HGB BLD-MCNC: 8.9 G/DL — LOW (ref 11.5–15.5)
HGB BLD-MCNC: 9 G/DL — LOW (ref 11.5–15.5)
IMM GRANULOCYTES NFR BLD AUTO: 0.6 % — SIGNIFICANT CHANGE UP (ref 0–0.9)
IMM GRANULOCYTES NFR BLD AUTO: 0.7 % — SIGNIFICANT CHANGE UP (ref 0–0.9)
KLEIHAUER-BETKE CALCULATION: 0 % — SIGNIFICANT CHANGE UP (ref 0–0.2)
LYMPHOCYTES # BLD AUTO: 1.72 K/UL — SIGNIFICANT CHANGE UP (ref 1–3.3)
LYMPHOCYTES # BLD AUTO: 10.3 % — LOW (ref 13–44)
LYMPHOCYTES # BLD AUTO: 12.9 % — LOW (ref 13–44)
LYMPHOCYTES # BLD AUTO: 2.29 K/UL — SIGNIFICANT CHANGE UP (ref 1–3.3)
MCHC RBC-ENTMCNC: 28.7 PG — SIGNIFICANT CHANGE UP (ref 27–34)
MCHC RBC-ENTMCNC: 28.8 PG — SIGNIFICANT CHANGE UP (ref 27–34)
MCHC RBC-ENTMCNC: 33.1 GM/DL — SIGNIFICANT CHANGE UP (ref 32–36)
MCHC RBC-ENTMCNC: 33.6 GM/DL — SIGNIFICANT CHANGE UP (ref 32–36)
MCV RBC AUTO: 85.8 FL — SIGNIFICANT CHANGE UP (ref 80–100)
MCV RBC AUTO: 86.6 FL — SIGNIFICANT CHANGE UP (ref 80–100)
MONOCYTES # BLD AUTO: 1.3 K/UL — HIGH (ref 0–0.9)
MONOCYTES # BLD AUTO: 1.47 K/UL — HIGH (ref 0–0.9)
MONOCYTES NFR BLD AUTO: 7.8 % — SIGNIFICANT CHANGE UP (ref 2–14)
MONOCYTES NFR BLD AUTO: 8.3 % — SIGNIFICANT CHANGE UP (ref 2–14)
NEUTROPHILS # BLD AUTO: 13.5 K/UL — HIGH (ref 1.8–7.4)
NEUTROPHILS # BLD AUTO: 13.83 K/UL — HIGH (ref 1.8–7.4)
NEUTROPHILS NFR BLD AUTO: 77.7 % — HIGH (ref 43–77)
NEUTROPHILS NFR BLD AUTO: 81 % — HIGH (ref 43–77)
NRBC # BLD: 0 /100 WBCS — SIGNIFICANT CHANGE UP (ref 0–0)
NRBC # BLD: 0 /100 WBCS — SIGNIFICANT CHANGE UP (ref 0–0)
PLATELET # BLD AUTO: 218 K/UL — SIGNIFICANT CHANGE UP (ref 150–400)
PLATELET # BLD AUTO: 220 K/UL — SIGNIFICANT CHANGE UP (ref 150–400)
RBC # BLD: 3.09 M/UL — LOW (ref 3.8–5.2)
RBC # BLD: 3.14 M/UL — LOW (ref 3.8–5.2)
RBC # FLD: 20.1 % — HIGH (ref 10.3–14.5)
RBC # FLD: 20.3 % — HIGH (ref 10.3–14.5)
T PALLIDUM AB TITR SER: NEGATIVE — SIGNIFICANT CHANGE UP
WBC # BLD: 16.67 K/UL — HIGH (ref 3.8–10.5)
WBC # BLD: 17.78 K/UL — HIGH (ref 3.8–10.5)
WBC # FLD AUTO: 16.67 K/UL — HIGH (ref 3.8–10.5)
WBC # FLD AUTO: 17.78 K/UL — HIGH (ref 3.8–10.5)

## 2024-05-07 RX ORDER — ASCORBIC ACID 60 MG
500 TABLET,CHEWABLE ORAL THREE TIMES A DAY
Refills: 0 | Status: DISCONTINUED | OUTPATIENT
Start: 2024-05-07 | End: 2024-05-10

## 2024-05-07 RX ORDER — IBUPROFEN 200 MG
600 TABLET ORAL EVERY 6 HOURS
Refills: 0 | Status: DISCONTINUED | OUTPATIENT
Start: 2024-05-07 | End: 2024-05-10

## 2024-05-07 RX ORDER — FERROUS SULFATE 325(65) MG
325 TABLET ORAL THREE TIMES A DAY
Refills: 0 | Status: DISCONTINUED | OUTPATIENT
Start: 2024-05-07 | End: 2024-05-10

## 2024-05-07 RX ORDER — DIPHENHYDRAMINE HCL 50 MG
25 CAPSULE ORAL EVERY 6 HOURS
Refills: 0 | Status: DISCONTINUED | OUTPATIENT
Start: 2024-05-07 | End: 2024-05-10

## 2024-05-07 RX ADMIN — Medication 25 MILLIGRAM(S): at 21:04

## 2024-05-07 RX ADMIN — Medication 500 MILLIGRAM(S): at 21:03

## 2024-05-07 RX ADMIN — Medication 600 MILLIGRAM(S): at 18:40

## 2024-05-07 RX ADMIN — Medication 975 MILLIGRAM(S): at 02:46

## 2024-05-07 RX ADMIN — SIMETHICONE 80 MILLIGRAM(S): 80 TABLET, CHEWABLE ORAL at 15:31

## 2024-05-07 RX ADMIN — Medication 975 MILLIGRAM(S): at 21:03

## 2024-05-07 RX ADMIN — Medication 30 MILLIGRAM(S): at 05:15

## 2024-05-07 RX ADMIN — Medication 975 MILLIGRAM(S): at 15:50

## 2024-05-07 RX ADMIN — Medication 600 MILLIGRAM(S): at 18:10

## 2024-05-07 RX ADMIN — Medication 975 MILLIGRAM(S): at 21:35

## 2024-05-07 RX ADMIN — SIMETHICONE 80 MILLIGRAM(S): 80 TABLET, CHEWABLE ORAL at 08:12

## 2024-05-07 RX ADMIN — Medication 30 MILLIGRAM(S): at 11:30

## 2024-05-07 RX ADMIN — Medication 325 MILLIGRAM(S): at 21:04

## 2024-05-07 RX ADMIN — Medication 975 MILLIGRAM(S): at 08:12

## 2024-05-07 RX ADMIN — HEPARIN SODIUM 5000 UNIT(S): 5000 INJECTION INTRAVENOUS; SUBCUTANEOUS at 11:30

## 2024-05-07 RX ADMIN — Medication 975 MILLIGRAM(S): at 02:16

## 2024-05-07 RX ADMIN — SIMETHICONE 80 MILLIGRAM(S): 80 TABLET, CHEWABLE ORAL at 21:05

## 2024-05-07 RX ADMIN — Medication 975 MILLIGRAM(S): at 08:50

## 2024-05-07 RX ADMIN — Medication 975 MILLIGRAM(S): at 15:20

## 2024-05-07 RX ADMIN — Medication 30 MILLIGRAM(S): at 12:00

## 2024-05-07 NOTE — PROGRESS NOTE ADULT - ASSESSMENT
Patient is POD#1 from rLTCS and cerclage removal with di-di TIUP c/b uterine rupture, PPH QBL 1739, and PTD 32w. Vital signs are stable and physical exam is unremarkable.  - Increase ambulation  - Continue regular diet  - Tariq is removed, awaiting void  - Awaiting flatus; mild gas pain per patient and abdomen is distended. Encourage hot liquids and ambulation as well as Simethicone.  - H/H: 12.6/39.1->11.6/33.9->8.9/26.5, plan for repeat at vicky Castro, PGY-1

## 2024-05-08 RX ORDER — OXYCODONE HYDROCHLORIDE 5 MG/1
5 TABLET ORAL
Refills: 0 | Status: DISCONTINUED | OUTPATIENT
Start: 2024-05-08 | End: 2024-05-10

## 2024-05-08 RX ORDER — ONDANSETRON 8 MG/1
4 TABLET, FILM COATED ORAL ONCE
Refills: 0 | Status: DISCONTINUED | OUTPATIENT
Start: 2024-05-08 | End: 2024-05-10

## 2024-05-08 RX ADMIN — SIMETHICONE 80 MILLIGRAM(S): 80 TABLET, CHEWABLE ORAL at 09:21

## 2024-05-08 RX ADMIN — Medication 500 MILLIGRAM(S): at 05:51

## 2024-05-08 RX ADMIN — Medication 600 MILLIGRAM(S): at 21:30

## 2024-05-08 RX ADMIN — OXYCODONE HYDROCHLORIDE 5 MILLIGRAM(S): 5 TABLET ORAL at 03:45

## 2024-05-08 RX ADMIN — HEPARIN SODIUM 5000 UNIT(S): 5000 INJECTION INTRAVENOUS; SUBCUTANEOUS at 14:24

## 2024-05-08 RX ADMIN — Medication 600 MILLIGRAM(S): at 14:11

## 2024-05-08 RX ADMIN — Medication 975 MILLIGRAM(S): at 02:42

## 2024-05-08 RX ADMIN — OXYCODONE HYDROCHLORIDE 5 MILLIGRAM(S): 5 TABLET ORAL at 17:45

## 2024-05-08 RX ADMIN — Medication 600 MILLIGRAM(S): at 05:51

## 2024-05-08 RX ADMIN — OXYCODONE HYDROCHLORIDE 5 MILLIGRAM(S): 5 TABLET ORAL at 03:15

## 2024-05-08 RX ADMIN — HEPARIN SODIUM 5000 UNIT(S): 5000 INJECTION INTRAVENOUS; SUBCUTANEOUS at 23:46

## 2024-05-08 RX ADMIN — OXYCODONE HYDROCHLORIDE 5 MILLIGRAM(S): 5 TABLET ORAL at 20:46

## 2024-05-08 RX ADMIN — Medication 975 MILLIGRAM(S): at 18:49

## 2024-05-08 RX ADMIN — Medication 325 MILLIGRAM(S): at 05:51

## 2024-05-08 RX ADMIN — OXYCODONE HYDROCHLORIDE 5 MILLIGRAM(S): 5 TABLET ORAL at 14:24

## 2024-05-08 RX ADMIN — OXYCODONE HYDROCHLORIDE 5 MILLIGRAM(S): 5 TABLET ORAL at 23:46

## 2024-05-08 RX ADMIN — OXYCODONE HYDROCHLORIDE 5 MILLIGRAM(S): 5 TABLET ORAL at 21:30

## 2024-05-08 RX ADMIN — Medication 975 MILLIGRAM(S): at 23:46

## 2024-05-08 RX ADMIN — Medication 975 MILLIGRAM(S): at 03:30

## 2024-05-08 RX ADMIN — Medication 325 MILLIGRAM(S): at 22:30

## 2024-05-08 RX ADMIN — Medication 500 MILLIGRAM(S): at 22:30

## 2024-05-08 RX ADMIN — Medication 600 MILLIGRAM(S): at 00:40

## 2024-05-08 RX ADMIN — Medication 600 MILLIGRAM(S): at 06:25

## 2024-05-08 RX ADMIN — Medication 600 MILLIGRAM(S): at 20:46

## 2024-05-08 RX ADMIN — Medication 600 MILLIGRAM(S): at 00:06

## 2024-05-08 RX ADMIN — HEPARIN SODIUM 5000 UNIT(S): 5000 INJECTION INTRAVENOUS; SUBCUTANEOUS at 00:12

## 2024-05-08 NOTE — PROGRESS NOTE ADULT - ASSESSMENT
Patient is POD#2 from rLTCS and cerclage removal with di-di TIUP c/b uterine rupture, PPH QBL 1739, and PTD 32w. Vital signs are stable and physical exam is unremarkable.  - Increase ambulation  - Continue regular diet  - H/H: 12.6/39.1->11.6/33.9->8.9/26.5->9/27.2; stable     Blanca Morgan, PGY-1  Patient is POD#2 from rLTCS and cerclage removal with di-di TIUP c/b uterine rupture, PPH QBL 1739, and PTD 32w. Vital signs are stable and physical exam is unremarkable.  - Increase ambulation  - Continue regular diet  - H/H: 12.6/39.1->11.6/33.9->8.9/26.5->9/27.2; stable     Blanca Morgan, PGY-1     ATTG:  Pt seen and evaluated; she has some abdominal distention, which she attributes to gas  Afebrile and normotensive  Abd soft, NT (+)BS  EXT NT    Stable POD#2 s/p repeat C/S with hemoperitoneum and uterine rupture from previous classical C/S  Routine postop care  Encourage ambulation

## 2024-05-09 ENCOUNTER — APPOINTMENT (OUTPATIENT)
Dept: ANTEPARTUM | Facility: CLINIC | Age: 39
End: 2024-05-09

## 2024-05-09 RX ORDER — OXYCODONE HYDROCHLORIDE 5 MG/1
5 TABLET ORAL ONCE
Refills: 0 | Status: DISCONTINUED | OUTPATIENT
Start: 2024-05-09 | End: 2024-05-10

## 2024-05-09 RX ADMIN — Medication 975 MILLIGRAM(S): at 12:05

## 2024-05-09 RX ADMIN — HEPARIN SODIUM 5000 UNIT(S): 5000 INJECTION INTRAVENOUS; SUBCUTANEOUS at 12:05

## 2024-05-09 RX ADMIN — Medication 600 MILLIGRAM(S): at 02:49

## 2024-05-09 RX ADMIN — HEPARIN SODIUM 5000 UNIT(S): 5000 INJECTION INTRAVENOUS; SUBCUTANEOUS at 23:52

## 2024-05-09 RX ADMIN — OXYCODONE HYDROCHLORIDE 5 MILLIGRAM(S): 5 TABLET ORAL at 03:30

## 2024-05-09 RX ADMIN — Medication 975 MILLIGRAM(S): at 17:38

## 2024-05-09 RX ADMIN — OXYCODONE HYDROCHLORIDE 5 MILLIGRAM(S): 5 TABLET ORAL at 09:37

## 2024-05-09 RX ADMIN — Medication 975 MILLIGRAM(S): at 06:20

## 2024-05-09 RX ADMIN — SIMETHICONE 80 MILLIGRAM(S): 80 TABLET, CHEWABLE ORAL at 09:07

## 2024-05-09 RX ADMIN — Medication 600 MILLIGRAM(S): at 14:44

## 2024-05-09 RX ADMIN — Medication 600 MILLIGRAM(S): at 20:51

## 2024-05-09 RX ADMIN — Medication 600 MILLIGRAM(S): at 21:32

## 2024-05-09 RX ADMIN — Medication 325 MILLIGRAM(S): at 05:56

## 2024-05-09 RX ADMIN — OXYCODONE HYDROCHLORIDE 5 MILLIGRAM(S): 5 TABLET ORAL at 18:08

## 2024-05-09 RX ADMIN — Medication 600 MILLIGRAM(S): at 03:15

## 2024-05-09 RX ADMIN — Medication 975 MILLIGRAM(S): at 05:55

## 2024-05-09 RX ADMIN — SIMETHICONE 80 MILLIGRAM(S): 80 TABLET, CHEWABLE ORAL at 14:14

## 2024-05-09 RX ADMIN — Medication 975 MILLIGRAM(S): at 18:08

## 2024-05-09 RX ADMIN — OXYCODONE HYDROCHLORIDE 5 MILLIGRAM(S): 5 TABLET ORAL at 09:07

## 2024-05-09 RX ADMIN — OXYCODONE HYDROCHLORIDE 5 MILLIGRAM(S): 5 TABLET ORAL at 17:38

## 2024-05-09 RX ADMIN — OXYCODONE HYDROCHLORIDE 5 MILLIGRAM(S): 5 TABLET ORAL at 00:30

## 2024-05-09 RX ADMIN — Medication 975 MILLIGRAM(S): at 23:52

## 2024-05-09 RX ADMIN — Medication 600 MILLIGRAM(S): at 09:07

## 2024-05-09 RX ADMIN — OXYCODONE HYDROCHLORIDE 5 MILLIGRAM(S): 5 TABLET ORAL at 05:55

## 2024-05-09 RX ADMIN — Medication 600 MILLIGRAM(S): at 09:37

## 2024-05-09 RX ADMIN — OXYCODONE HYDROCHLORIDE 5 MILLIGRAM(S): 5 TABLET ORAL at 02:50

## 2024-05-09 RX ADMIN — Medication 600 MILLIGRAM(S): at 14:14

## 2024-05-09 RX ADMIN — Medication 975 MILLIGRAM(S): at 00:30

## 2024-05-09 RX ADMIN — Medication 500 MILLIGRAM(S): at 05:56

## 2024-05-09 RX ADMIN — Medication 975 MILLIGRAM(S): at 12:35

## 2024-05-09 NOTE — PROGRESS NOTE ADULT - ASSESSMENT
Patient is POD#3 from rLTCS and cerclage removal with di-di TIUP c/b uterine rupture, PPH QBL 1739, and PTD 32w. Vital signs are stable and physical exam is unremarkable.  - Increase ambulation  - Continue regular diet  - H/H: 12.6/39.1->11.6/33.9->8.9/26.5->9/27.2; stable   - Urinary retention resolved  - Patient passing gas however still very distended, encouraged ambulation, drinking hot tea this morning, peppermint tea    CALDERON Castro, PGY-1

## 2024-05-10 ENCOUNTER — TRANSCRIPTION ENCOUNTER (OUTPATIENT)
Age: 39
End: 2024-05-10

## 2024-05-10 VITALS
SYSTOLIC BLOOD PRESSURE: 114 MMHG | OXYGEN SATURATION: 98 % | DIASTOLIC BLOOD PRESSURE: 74 MMHG | TEMPERATURE: 98 F | RESPIRATION RATE: 18 BRPM | HEART RATE: 78 BPM

## 2024-05-10 LAB — SURGICAL PATHOLOGY STUDY: SIGNIFICANT CHANGE UP

## 2024-05-10 PROCEDURE — 86901 BLOOD TYPING SEROLOGIC RH(D): CPT

## 2024-05-10 PROCEDURE — 85027 COMPLETE CBC AUTOMATED: CPT

## 2024-05-10 PROCEDURE — 59050 FETAL MONITOR W/REPORT: CPT

## 2024-05-10 PROCEDURE — C1889: CPT

## 2024-05-10 PROCEDURE — 88307 TISSUE EXAM BY PATHOLOGIST: CPT

## 2024-05-10 PROCEDURE — 86850 RBC ANTIBODY SCREEN: CPT

## 2024-05-10 PROCEDURE — 86780 TREPONEMA PALLIDUM: CPT

## 2024-05-10 PROCEDURE — 59025 FETAL NON-STRESS TEST: CPT

## 2024-05-10 PROCEDURE — 88305 TISSUE EXAM BY PATHOLOGIST: CPT

## 2024-05-10 PROCEDURE — 86900 BLOOD TYPING SEROLOGIC ABO: CPT

## 2024-05-10 PROCEDURE — 85460 HEMOGLOBIN FETAL: CPT

## 2024-05-10 PROCEDURE — 85025 COMPLETE CBC W/AUTO DIFF WBC: CPT

## 2024-05-10 RX ORDER — FERROUS SULFATE 325(65) MG
1 TABLET ORAL
Qty: 0 | Refills: 0 | DISCHARGE
Start: 2024-05-10

## 2024-05-10 RX ORDER — PROGESTERONE 200 MG/1
1 CAPSULE, LIQUID FILLED ORAL
Refills: 0 | DISCHARGE

## 2024-05-10 RX ORDER — IBUPROFEN 200 MG
1 TABLET ORAL
Qty: 0 | Refills: 0 | DISCHARGE
Start: 2024-05-10

## 2024-05-10 RX ORDER — ACETAMINOPHEN 500 MG
3 TABLET ORAL
Qty: 0 | Refills: 0 | DISCHARGE
Start: 2024-05-10

## 2024-05-10 RX ORDER — OXYCODONE HYDROCHLORIDE 5 MG/1
1 TABLET ORAL
Qty: 0 | Refills: 0 | DISCHARGE
Start: 2024-05-10

## 2024-05-10 RX ADMIN — Medication 500 MILLIGRAM(S): at 05:28

## 2024-05-10 RX ADMIN — Medication 975 MILLIGRAM(S): at 00:25

## 2024-05-10 RX ADMIN — Medication 600 MILLIGRAM(S): at 14:11

## 2024-05-10 RX ADMIN — Medication 600 MILLIGRAM(S): at 08:40

## 2024-05-10 RX ADMIN — Medication 600 MILLIGRAM(S): at 15:11

## 2024-05-10 RX ADMIN — Medication 500 MILLIGRAM(S): at 14:11

## 2024-05-10 RX ADMIN — Medication 325 MILLIGRAM(S): at 05:28

## 2024-05-10 RX ADMIN — Medication 600 MILLIGRAM(S): at 03:19

## 2024-05-10 RX ADMIN — Medication 975 MILLIGRAM(S): at 05:28

## 2024-05-10 RX ADMIN — Medication 325 MILLIGRAM(S): at 14:12

## 2024-05-10 RX ADMIN — Medication 975 MILLIGRAM(S): at 12:47

## 2024-05-10 RX ADMIN — Medication 600 MILLIGRAM(S): at 09:40

## 2024-05-10 RX ADMIN — Medication 975 MILLIGRAM(S): at 06:25

## 2024-05-10 RX ADMIN — HEPARIN SODIUM 5000 UNIT(S): 5000 INJECTION INTRAVENOUS; SUBCUTANEOUS at 11:49

## 2024-05-10 RX ADMIN — Medication 600 MILLIGRAM(S): at 02:43

## 2024-05-10 RX ADMIN — Medication 975 MILLIGRAM(S): at 11:47

## 2024-05-10 NOTE — LACTATION INITIAL EVALUATION - INTERVENTION OUTCOME
verbalizes understanding/demonstrates understanding of teaching/good return demonstration
verbalizes understanding/needs met/Lactation team to follow up
Aware of LC availability./verbalizes understanding/demonstrates understanding of teaching/good return demonstration/needs met

## 2024-05-10 NOTE — LACTATION INITIAL EVALUATION - POTENTIAL FOR
ineffective breastfeeding/knowledge deficit
knowledge deficit
ineffective breastfeeding/knowledge deficit

## 2024-05-10 NOTE — DISCHARGE NOTE OB - CARE PROVIDER_API CALL
Tona Fulton  Obstetrics and Gynecology  [address] [address]  Phone: [phone]  Fax: [phone]  Follow Up Time:

## 2024-05-10 NOTE — DISCHARGE NOTE OB - MEDICATION SUMMARY - MEDICATIONS TO TAKE
I will START or STAY ON the medications listed below when I get home from the hospital:    ibuprofen 600 mg oral tablet  -- 1 tab(s) by mouth every 6 hours  -- Indication: For pain    acetaminophen 325 mg oral tablet  -- 3 tab(s) by mouth every 6 hours  -- Indication: For pain    oxyCODONE 5 mg oral tablet  -- 1 tab(s) by mouth every 3 hours As needed Moderate to Severe Pain (4-10)  -- Indication: For increased pain    ferrous sulfate 325 mg (65 mg elemental iron) oral tablet  -- 1 tab(s) by mouth 3 times a day  -- Indication: For anemia

## 2024-05-10 NOTE — LACTATION INITIAL EVALUATION - NS LACT CON REASON FOR REQ
32 week twins/primaparous mom/premature infant/follow up consultation
twins born at 32 weeks gestation/general questions without assessment/primaparous mom/premature infant/follow up consultation
32 week infant in nicu for prematurity/primaparous mom/premature infant/NICU admission

## 2024-05-10 NOTE — PROGRESS NOTE ADULT - SUBJECTIVE AND OBJECTIVE BOX
Back noted ~ 10: 30am    pelvic cramping as walk in room, out of bed.  Nurses at bedside.   No HA, CP, SOB  No heavy vaginal bleeding (VB)/normal lochia    ICU Vital Signs Last 24 Hrs  T(C): 36.6 (07 May 2024 09:00), Max: 36.9 (07 May 2024 06:02)  T(F): 97.9 (07 May 2024 09:00), Max: 98.4 (07 May 2024 06:02)  HR: 77 (07 May 2024 09:00) (75 - 116)  BP: 114/72 (07 May 2024 09:00) (96/59 - 133/58)  BP(mean): 83 (06 May 2024 17:45) (71 - 83)  RR: 18 (07 May 2024 09:00) (14 - 18)  SpO2: 98% (07 May 2024 09:00) (97% - 100%)    O2 Parameters below as of 07 May 2024 06:02  Patient On (Oxygen Delivery Method): room air                          9.0    17.78 )-----------( 220      ( 07 May 2024 12:24 )             27.2         moaning in pain  abd: distended, tender  ext: nontender      POD # 1 s/p repeat C/S in labor complicated by uterine rupture at former classical C/S site  bladder retention w 800ml out on hidalgo placement  notable relief and reduction of distention    anemia w repeat HG stable    FE supplementation    hidalgo out in 12 hours w repeat DVT      Patient seen and evaluated by me. I agree with resident note unless otherwise stated.   Routine postpartum care, regular diet as tolerated, ambulate and pain control as needed.     babies in NICU, stable on CPAP    JUAN Fulton MD  Attending  
Patient seen and examined at bedside, no acute overnight events. No acute complaints, pain well controlled. Patient is ambulating, voiding spontaneously, passing flatus, and tolerating regular diet. Denies CP, SOB, N/V, HA, blurred vision, epigastric pain.    Vital Signs Last 24 Hours  T(C): 36.7 (05-09-24 @ 05:55), Max: 37.2 (05-08-24 @ 09:34)  HR: 76 (05-09-24 @ 05:55) (76 - 84)  BP: 99/62 (05-09-24 @ 05:55) (99/62 - 121/79)  RR: 18 (05-09-24 @ 05:55) (18 - 18)  SpO2: 97% (05-09-24 @ 05:55) (97% - 98%)    Physical Exam:  General: NAD  Abdomen: Soft, non-tender, moderately distended, fundus firm  Incision: Pfannenstiel incision CDI, subcuticular suture closure  Pelvic: Lochia wnl    Labs:    Blood Type: B Negative  Antibody Screen: Negative  RPR: Negative               9.0    17.78 )-----------( 220      ( 05-07 @ 12:24 )             27.2                8.9    16.67 )-----------( 218      ( 05-07 @ 06:25 )             26.5                11.6   18.41 )-----------( 264      ( 05-06 @ 16:01 )             33.9         MEDICATIONS  (STANDING):  acetaminophen     Tablet .. 975 milliGRAM(s) Oral <User Schedule>  acetaminophen   IVPB .. 1000 milliGRAM(s) IV Intermittent once  ascorbic acid 500 milliGRAM(s) Oral three times a day  diphtheria/tetanus/pertussis (acellular) Vaccine (Adacel) 0.5 milliLiter(s) IntraMuscular once  ferrous    sulfate 325 milliGRAM(s) Oral three times a day  heparin   Injectable 5000 Unit(s) SubCutaneous every 12 hours  ibuprofen  Tablet. 600 milliGRAM(s) Oral every 6 hours  lactated ringers. 1000 milliLiter(s) (125 mL/Hr) IV Continuous <Continuous>  lactated ringers. 1000 milliLiter(s) (200 mL/Hr) IV Continuous <Continuous>  ondansetron   Disintegrating Tablet 4 milliGRAM(s) Oral once  oxytocin Infusion 333.333 milliUNIT(s)/Min (1000 mL/Hr) IV Continuous <Continuous>  oxytocin Infusion 333.333 milliUNIT(s)/Min (1000 mL/Hr) IV Continuous <Continuous>    MEDICATIONS  (PRN):  diphenhydrAMINE 25 milliGRAM(s) Oral every 6 hours PRN Pruritus  lanolin Ointment 1 Application(s) Topical every 6 hours PRN Sore Nipples  magnesium hydroxide Suspension 30 milliLiter(s) Oral two times a day PRN Constipation  oxyCODONE    IR 5 milliGRAM(s) Oral once PRN Moderate to Severe Pain (4-10)  oxyCODONE    IR 5 milliGRAM(s) Oral every 3 hours PRN Moderate to Severe Pain (4-10)  simethicone 80 milliGRAM(s) Chew every 4 hours PRN Gas  
R1 POD#2 rLTCS Progress Note    Patient seen and examined at bedside, no acute overnight events. No acute complaints, pain well controlled. Patient is ambulating and tolerating regular diet. Has passed flatus. Patient voiding independently. Denies CP, SOB, N/V, HA, blurred vision, epigastric pain. Bleeding minimal.    Vital Signs Last 24 Hours  T(C): 36.8 (05-08-24 @ 01:05), Max: 36.9 (05-07-24 @ 06:02)  HR: 94 (05-08-24 @ 01:05) (75 - 94)  BP: 120/82 (05-08-24 @ 01:05) (99/62 - 123/80)  RR: 18 (05-08-24 @ 01:05) (18 - 18)  SpO2: 97% (05-08-24 @ 01:05) (97% - 99%)    I&O's Summary    06 May 2024 07:01  -  07 May 2024 07:00  --------------------------------------------------------  IN: 1500 mL / OUT: 2682 mL / NET: -1182 mL    07 May 2024 07:01  -  08 May 2024 03:08  --------------------------------------------------------  IN: 0 mL / OUT: 3100 mL / NET: -3100 mL        Physical Exam:  General: NAD  Abdomen: Soft, non-tender, non-distended, fundus firm  Incision: Pfannenstiel incision CDI, subcuticular suture closure  Pelvic: Lochia wnl    Labs:    Blood Type: B Negative  Antibody Screen: Negative  RPR: Negative               9.0    17.78 )-----------( 220      ( 05-07 @ 12:24 )             27.2                8.9    16.67 )-----------( 218      ( 05-07 @ 06:25 )             26.5                11.6   18.41 )-----------( 264      ( 05-06 @ 16:01 )             33.9         MEDICATIONS  (STANDING):  acetaminophen     Tablet .. 975 milliGRAM(s) Oral <User Schedule>  acetaminophen   IVPB .. 1000 milliGRAM(s) IV Intermittent once  ascorbic acid 500 milliGRAM(s) Oral three times a day  diphtheria/tetanus/pertussis (acellular) Vaccine (Adacel) 0.5 milliLiter(s) IntraMuscular once  ferrous    sulfate 325 milliGRAM(s) Oral three times a day  heparin   Injectable 5000 Unit(s) SubCutaneous every 12 hours  ibuprofen  Tablet. 600 milliGRAM(s) Oral every 6 hours  lactated ringers. 1000 milliLiter(s) (125 mL/Hr) IV Continuous <Continuous>  lactated ringers. 1000 milliLiter(s) (200 mL/Hr) IV Continuous <Continuous>  oxytocin Infusion 333.333 milliUNIT(s)/Min (1000 mL/Hr) IV Continuous <Continuous>  oxytocin Infusion 333.333 milliUNIT(s)/Min (1000 mL/Hr) IV Continuous <Continuous>    MEDICATIONS  (PRN):  diphenhydrAMINE 25 milliGRAM(s) Oral every 6 hours PRN Pruritus  lanolin Ointment 1 Application(s) Topical every 6 hours PRN Sore Nipples  magnesium hydroxide Suspension 30 milliLiter(s) Oral two times a day PRN Constipation  oxyCODONE    IR 5 milliGRAM(s) Oral once PRN Moderate to Severe Pain (4-10)  oxyCODONE    IR 5 milliGRAM(s) Oral every 3 hours PRN Moderate to Severe Pain (4-10)  simethicone 80 milliGRAM(s) Chew every 4 hours PRN Gas  
Day 1 of Anesthesia Pain Management Service    SUBJECTIVE:  Pain Scale Score:          [X] Refer to charted pain scores    THERAPY: Received PF spinal morphine as above    OBJECTIVE:    Sedation:        	[X] Alert	[ ] Drowsy	[ ] Arousable      [ ] Asleep       [ ] Unresponsive    Side Effects:	[X] None	[ ] Nausea	[ ] Vomiting         [ ] Pruritus  		[ ] Weakness            [ ] Numbness	          [ ] Other:    ASSESSMENT/ PLAN  [X] Patient transitioned to prn analgesics  [X] Pain management per primary service, pain service to sign off   [X]Documentation and Verification of current medications
Day 1 of Anesthesia Pain Management Service    SUBJECTIVE: Doing ok  Pain Scale Score:          [X] Refer to charted pain scores    THERAPY:  s/p   2  mg PF epidural morphine on 5\6\2024       MEDICATIONS  (STANDING):  acetaminophen Tablet 975 milliGRAM(s) Oral <User Schedule>  acetaminophen IVPB 1000 milliGRAM(s) IV Intermittent once  diphtheria/tetanus/pertussis (acellular) Vaccine (Adacel) 0.5 milliLiter(s) IntraMuscular once  heparin   Injectable 5000 Unit(s) SubCutaneous every 12 hours  ibuprofen  Tablet. 600 milliGRAM(s) Oral every 6 hours  ketorolac   Injectable 30 milliGRAM(s) IV Push every 6 hours  lactated ringers. 1000 milliLiter(s) (125 mL/Hr) IV Continuous <Continuous>  lactated ringers. 1000 milliLiter(s) (200 mL/Hr) IV Continuous <Continuous>  morphine PF Epidural 2 milliGRAM(s) Epidural once  oxytocin Infusion 333.333 milliUNIT(s)/Min (1000 mL/Hr) IV Continuous <Continuous>  oxytocin Infusion 333.333 milliUNIT(s)/Min (1000 mL/Hr) IV Continuous <Continuous>    MEDICATIONS  (PRN):  dexAMETHasone  Injectable 4 milliGRAM(s) IV Push every 6 hours PRN Nausea  diphenhydrAMINE 25 milliGRAM(s) Oral every 6 hours PRN Pruritus  lanolin Ointment 1 Application(s) Topical every 6 hours PRN Sore Nipples  magnesium hydroxide Suspension 30 milliLiter(s) Oral two times a day PRN Constipation  nalbuphine Injectable 2.5 milliGRAM(s) IV Push every 6 hours PRN Pruritus  naloxone Injectable 0.1 milliGRAM(s) IV Push every 3 minutes PRN For ANY of the following changes in patient status:  A. Breaths Per Minute LESS THAN 10, B. Oxygen saturation LESS THAN 90%, C. Sedation score of 6 for Stop After: 4 Times  ondansetron Injectable 4 milliGRAM(s) IV Push every 6 hours PRN Nausea  oxyCODONE    IR 5 milliGRAM(s) Oral every 3 hours PRN Moderate to Severe Pain (4-10)  oxyCODONE    IR 5 milliGRAM(s) Oral once PRN Moderate to Severe Pain (4-10)  oxyCODONE    IR 5 milliGRAM(s) Oral every 3 hours PRN Mild Pain (1 - 3)  simethicone 80 milliGRAM(s) Chew every 4 hours PRN Gas      OBJECTIVE:    Sedation:        	[X] Alert	 [ ] Drowsy	[ ] Arousable      [ ] Asleep       [ ] Unresponsive    Side Effects:	[ ] None 	[ ] Nausea	[ ] Vomiting         [ X] Pruritus: Nubain prn  		[ ] Weakness            [ ] Numbness	          [ ] Other:    Vital Signs Last 24 Hrs  T(C): 36.6 (07 May 2024 09:00), Max: 36.9 (07 May 2024 06:02)  T(F): 97.9 (07 May 2024 09:00), Max: 98.4 (07 May 2024 06:02)  HR: 77 (07 May 2024 09:00) (71 - 116)  BP: 114/72 (07 May 2024 09:00) (82/46 - 155/68)  BP(mean): 83 (06 May 2024 17:45) (71 - 83)  RR: 18 (07 May 2024 09:00) (14 - 18)  SpO2: 98% (07 May 2024 09:00) (89% - 100%)    Parameters below as of 07 May 2024 06:02  Patient On (Oxygen Delivery Method): room air        ASSESSMENT/ PLAN  [X] Patient to be transitioned to prn analgesics after 24 hours  [X] Pain management per primary service, pain service to sign off   [X]Documentation and Verification of current medications
R1 POD#4 LTCS Progress Note    Patient seen and examined at bedside, no acute overnight events. No acute complaints, pain well controlled. Patient is ambulating and tolerating regular diet. Has passed flatus. Patient voiding independently. Denies CP, SOB, N/V, HA, blurred vision, epigastric pain. Bleeding minimal.    Vital Signs Last 24 Hours  T(C): 36.6 (05-09-24 @ 20:41), Max: 36.8 (05-09-24 @ 13:18)  HR: 75 (05-09-24 @ 20:41) (73 - 76)  BP: 112/68 (05-09-24 @ 20:41) (99/62 - 126/87)  RR: 18 (05-09-24 @ 20:41) (18 - 18)  SpO2: 98% (05-09-24 @ 20:41) (97% - 98%)    I&O's Summary      Physical Exam:  General: NAD  Abdomen: Soft, non-tender, non-distended, fundus firm  Incision: Pfannenstiel incision CDI, subcuticular suture closure  Pelvic: Lochia wnl    Labs:    Blood Type: B Negative  Antibody Screen: Negative  RPR: Negative               9.0    17.78 )-----------( 220      ( 05-07 @ 12:24 )             27.2                8.9    16.67 )-----------( 218      ( 05-07 @ 06:25 )             26.5                11.6   18.41 )-----------( 264      ( 05-06 @ 16:01 )             33.9         MEDICATIONS  (STANDING):  acetaminophen     Tablet .. 975 milliGRAM(s) Oral <User Schedule>  acetaminophen   IVPB .. 1000 milliGRAM(s) IV Intermittent once  ascorbic acid 500 milliGRAM(s) Oral three times a day  diphtheria/tetanus/pertussis (acellular) Vaccine (Adacel) 0.5 milliLiter(s) IntraMuscular once  ferrous    sulfate 325 milliGRAM(s) Oral three times a day  heparin   Injectable 5000 Unit(s) SubCutaneous every 12 hours  ibuprofen  Tablet. 600 milliGRAM(s) Oral every 6 hours  lactated ringers. 1000 milliLiter(s) (125 mL/Hr) IV Continuous <Continuous>  lactated ringers. 1000 milliLiter(s) (200 mL/Hr) IV Continuous <Continuous>  ondansetron   Disintegrating Tablet 4 milliGRAM(s) Oral once  oxytocin Infusion 333.333 milliUNIT(s)/Min (1000 mL/Hr) IV Continuous <Continuous>  oxytocin Infusion 333.333 milliUNIT(s)/Min (1000 mL/Hr) IV Continuous <Continuous>    MEDICATIONS  (PRN):  diphenhydrAMINE 25 milliGRAM(s) Oral every 6 hours PRN Pruritus  lanolin Ointment 1 Application(s) Topical every 6 hours PRN Sore Nipples  magnesium hydroxide Suspension 30 milliLiter(s) Oral two times a day PRN Constipation  oxyCODONE    IR 5 milliGRAM(s) Oral once PRN Moderate to Severe Pain (4-10)  oxyCODONE    IR 5 milliGRAM(s) Oral every 3 hours PRN Moderate to Severe Pain (4-10)  oxyCODONE    IR 5 milliGRAM(s) Oral once PRN Moderate to Severe Pain (4-10)  simethicone 80 milliGRAM(s) Chew every 4 hours PRN Gas  
Patient seen and examined at bedside, no acute overnight events. No acute complaints, pain well controlled. Patient is ambulating and tolerating regular diet. Awaiting void and flatus. Denies CP, SOB, N/V, HA, blurred vision, epigastric pain.    Vital Signs Last 24 Hours  T(C): 36.9 (05-07-24 @ 06:02), Max: 36.9 (05-07-24 @ 06:02)  HR: 75 (05-07-24 @ 06:02) (71 - 116)  BP: 99/62 (05-07-24 @ 06:02) (82/46 - 155/68)  RR: 18 (05-07-24 @ 06:02) (14 - 18)  SpO2: 97% (05-07-24 @ 06:02) (89% - 100%)    Physical Exam:  General: NAD  Abdomen: Soft, non-tender, moderately distended, fundus firm  Incision: Pfannenstiel incision CDI, subcuticular suture closure  Pelvic: Lochia wnl    Labs:    Blood Type: B Negative  Antibody Screen: Negative               8.9    16.67 )-----------( 218      ( 05-07 @ 06:25 )             26.5                11.6   18.41 )-----------( 264      ( 05-06 @ 16:01 )             33.9                12.6   9.80  )-----------( 271      ( 05-06 @ 11:02 )             39.1         MEDICATIONS  (STANDING):  acetaminophen     Tablet .. 975 milliGRAM(s) Oral <User Schedule>  acetaminophen   IVPB .. 1000 milliGRAM(s) IV Intermittent once  diphtheria/tetanus/pertussis (acellular) Vaccine (Adacel) 0.5 milliLiter(s) IntraMuscular once  heparin   Injectable 5000 Unit(s) SubCutaneous every 12 hours  ibuprofen  Tablet. 600 milliGRAM(s) Oral every 6 hours  ketorolac   Injectable 30 milliGRAM(s) IV Push every 6 hours  lactated ringers. 1000 milliLiter(s) (125 mL/Hr) IV Continuous <Continuous>  lactated ringers. 1000 milliLiter(s) (200 mL/Hr) IV Continuous <Continuous>  morphine PF Epidural 2 milliGRAM(s) Epidural once  oxytocin Infusion 333.333 milliUNIT(s)/Min (1000 mL/Hr) IV Continuous <Continuous>  oxytocin Infusion 333.333 milliUNIT(s)/Min (1000 mL/Hr) IV Continuous <Continuous>    MEDICATIONS  (PRN):  dexAMETHasone  Injectable 4 milliGRAM(s) IV Push every 6 hours PRN Nausea  diphenhydrAMINE 25 milliGRAM(s) Oral every 6 hours PRN Pruritus  lanolin Ointment 1 Application(s) Topical every 6 hours PRN Sore Nipples  magnesium hydroxide Suspension 30 milliLiter(s) Oral two times a day PRN Constipation  nalbuphine Injectable 2.5 milliGRAM(s) IV Push every 6 hours PRN Pruritus  naloxone Injectable 0.1 milliGRAM(s) IV Push every 3 minutes PRN For ANY of the following changes in patient status:  A. Breaths Per Minute LESS THAN 10, B. Oxygen saturation LESS THAN 90%, C. Sedation score of 6 for Stop After: 4 Times  ondansetron Injectable 4 milliGRAM(s) IV Push every 6 hours PRN Nausea  oxyCODONE    IR 5 milliGRAM(s) Oral every 3 hours PRN Moderate to Severe Pain (4-10)  oxyCODONE    IR 5 milliGRAM(s) Oral once PRN Moderate to Severe Pain (4-10)  oxyCODONE    IR 5 milliGRAM(s) Oral every 3 hours PRN Mild Pain (1 - 3)  simethicone 80 milliGRAM(s) Chew every 4 hours PRN Gas

## 2024-05-10 NOTE — DISCHARGE NOTE OB - HOSPITAL COURSE
Pt admitted at 32wks w twin pregnancy in  labor.  She had hx prior hysterotomy for 21wk delivery.  She underwent R C/S complicated by uterine rupture.  Her post op course was uncomplicated.  She was discharged home on POD 4.

## 2024-05-10 NOTE — PROGRESS NOTE ADULT - ATTENDING COMMENTS
Patient seen and examined  She is complaining of gas pain  She has no naseau or vomiting   She is tolerating food  She is taking Oxycontin around the clock  Abdomen distended   Recommend cutting back on Oxycontin  Ambulation strongly encouraged    Charityio
Pt w no complaints.  +OOB    +placido reg  VSS  D/C planning;  Instructions reviewed  F/U 2 wks  FeSO4 suppl

## 2024-05-10 NOTE — LACTATION INITIAL EVALUATION - LACTATION INTERVENTIONS
met with mother in room. Pumping guidelines reviewed. Hand expression shown. pumping guidelines, pump kit care, pump log, LC contact info provided. pump rental encouraged. [facility] as a bridge reviewed. Provided mother with a cooler bag and reusable ice pack to transport expressed human milk to NICU from home. support provided. needs met at this time./initiate/review hand expression/initiate/review pumping guidelines and safe milk handling
Reinforced pumping guidelines, storage & handling of EHM.  Mom said she did not "pump much" last 2 days but today plans to pump the full 8 times Discussed rationale for frequent stimulation of breast, declined observed pumping session, Mom plans to rent symphony pump for home use. Questions answered. made aware of lactation consultant availability/initiate/review safe skin-to-skin/initiate/review hand expression/initiate/review pumping guidelines and safe milk handling/initiate/review supplementation plan due to medical indications/review techniques to manage sore nipples/engorgement
F/U to offer lactation services, mother has not pumped in over 34 hours. Discussed impact on her supply and reviewed guidelines. Accepted  pump consult, changed flange to 21mm with better feel. Recommended rental of hospital grade pump for her discharge tomorrow. Mother aware we will transition to formula & agrees with feeding plan./initiate/review hand expression/initiate/review pumping guidelines and safe milk handling/post discharge community resources provided/initiate/review supplementation plan due to medical indications

## 2024-05-10 NOTE — LACTATION INITIAL EVALUATION - PRENATAL BREAST CHANGES
nipple/areola darkened and large
breast enlargement/nipple/areola darkened and large
breast enlargement/nipple/areola darkened and large

## 2024-05-10 NOTE — DISCHARGE NOTE OB - NS MD DC FALL RISK RISK
For information on Fall & Injury Prevention, visit: [email or web address] OR  [email or web address] OR  https://www.cdc.gov/steadi/patient.html

## 2024-05-10 NOTE — DISCHARGE NOTE OB - PATIENT PORTAL LINK FT
You can access the FollowMyHealth Patient Portal offered by [facility] by registering at the following website: [email or web address]. By joining [facility]’s FollowMyHealth portal, you will also be able to view your health information using other applications (apps) compatible with our system.

## 2024-05-10 NOTE — DISCHARGE NOTE OB - CARE PLAN
Principal Discharge DX:	 delivery delivered  Assessment and plan of treatment:	Nothing in vagina x 6 weeks  Follow up in office in 2 weeks  Secondary Diagnosis:	Anemia due to acute blood loss  Assessment and plan of treatment:	Take iron supplement daily   1

## 2024-05-10 NOTE — LACTATION INITIAL EVALUATION - AS EVIDENCED BY
patient stated/observation/prematurity/multiple birth/infant  from mother
patient stated/prematurity/multiple birth/infant  from mother
patient stated/prematurity/multiple birth/infant  from mother

## 2024-05-16 ENCOUNTER — APPOINTMENT (OUTPATIENT)
Dept: ANTEPARTUM | Facility: CLINIC | Age: 39
End: 2024-05-16

## 2024-05-23 ENCOUNTER — APPOINTMENT (OUTPATIENT)
Dept: ANTEPARTUM | Facility: CLINIC | Age: 39
End: 2024-05-23

## 2024-05-24 ENCOUNTER — APPOINTMENT (OUTPATIENT)
Dept: OBGYN | Facility: CLINIC | Age: 39
End: 2024-05-24
Payer: COMMERCIAL

## 2024-05-24 VITALS
BODY MASS INDEX: 26.87 KG/M2 | DIASTOLIC BLOOD PRESSURE: 73 MMHG | HEIGHT: 62 IN | SYSTOLIC BLOOD PRESSURE: 105 MMHG | WEIGHT: 146 LBS

## 2024-05-24 PROCEDURE — 0503F POSTPARTUM CARE VISIT: CPT

## 2024-05-27 RX ORDER — OXYCODONE 5 MG/1
5 TABLET ORAL
Qty: 20 | Refills: 0 | Status: COMPLETED | COMMUNITY
Start: 2024-05-10 | End: 2024-05-27

## 2024-05-27 RX ORDER — FAMOTIDINE 40 MG/1
40 TABLET, FILM COATED ORAL
Qty: 30 | Refills: 1 | Status: COMPLETED | COMMUNITY
Start: 2022-03-23 | End: 2024-05-27

## 2024-05-27 RX ORDER — PROGESTERONE 200 MG/1
200 CAPSULE ORAL
Qty: 90 | Refills: 0 | Status: COMPLETED | COMMUNITY
Start: 2024-01-09 | End: 2024-05-27

## 2024-05-27 RX ORDER — ONDANSETRON 4 MG/1
4 TABLET, ORALLY DISINTEGRATING ORAL 3 TIMES DAILY
Qty: 90 | Refills: 2 | Status: COMPLETED | COMMUNITY
End: 2024-05-27

## 2024-05-27 RX ORDER — DICLOXACILLIN SODIUM 500 MG/1
500 CAPSULE ORAL
Qty: 5 | Refills: 0 | Status: COMPLETED | COMMUNITY
Start: 2023-12-13 | End: 2024-05-27

## 2024-05-27 NOTE — HISTORY OF PRESENT ILLNESS
[Fever] : no fever [Chills] : no chills [Dysuria] : no dysuria [Clean/Dry/Intact] : clean, dry and intact [Erythema] : not erythematous [Dehiscence] : not dehisced [Healed] : healed [Doing Well] : is doing well [No Sign of Infection] : is showing no signs of infection

## 2024-06-14 ENCOUNTER — APPOINTMENT (OUTPATIENT)
Dept: OBGYN | Facility: CLINIC | Age: 39
End: 2024-06-14
Payer: COMMERCIAL

## 2024-06-14 VITALS — WEIGHT: 149.2 LBS | BODY MASS INDEX: 27.29 KG/M2 | DIASTOLIC BLOOD PRESSURE: 72 MMHG | SYSTOLIC BLOOD PRESSURE: 110 MMHG

## 2024-06-14 PROCEDURE — 0503F POSTPARTUM CARE VISIT: CPT

## 2024-08-04 PROBLEM — Z33.2 MEDICAL ABORTION: Status: RESOLVED | Noted: 2023-12-06 | Resolved: 2024-08-04

## 2024-10-17 NOTE — PROGRESS NOTE ADULT - ASSESSMENT
A/P: 36yo  POD#2 s/p pCCS & myomectomy (EBL 4000) @21w5d under GETA for hemodynamic instability 2/2 hemorrhagic complete placenta previa / PPROM s/p  4uPRBC + 2 FFP + 1 Plt intraop and SICU admission () for hemodynamic instability. Additional 1u pRBC given . WBC uptrending 19.2->28.26. Patient with nausea/vomiting, and abdominal pain concern for postop ileus and endometritis now passing flatus.     #Postoperative ileus with N/V  - NPO, LR@125   - f/u final abd Xray results   - now passing flatus  - encourage ambulation     #Presumed endometritis  - Febrile to 38 at 846p ()  - C/w Invanz (-)  - f/u BCx and UCx     #Acute blood loss anemia   - Elevated lactate, now resolved: 3.2->2.8->2.7->1.5->1.2  - Monitor Hct: 28.7->(4uPRBC)->24.5->24.2->21.6->21.1->1uPRBC->24.4->23.8    - Monitor Plt: 492->(1Plt)->309->320->277->265->250->250    - F/u AM CBC/CMP/Coags     #Fetal loss  - SW consult   - Demise paperwork completed     #Postoperative state  - IV Tylenol, Toradol, Gabapentin, Oxy and Dilaudid prn   - Incision dressing removed  - PT recs: home with home PT    #FEN/GI  - KVO  - Reg diet  - D/c Tariq pending AM labs    #Ppx  - DVT: SCDs, early ambulation,  HSQ       Berta Patton PGY3  A/P: 36yo  POD#2 s/p pCCS & myomectomy (EBL 4000) @21w5d under GETA for hemodynamic instability 2/2 hemorrhagic complete placenta previa / PPROM s/p  4uPRBC + 2 FFP + 1 Plt intraop and SICU admission () for hemodynamic instability. Additional 1u pRBC given . WBC uptrending 19.2->28.26. Patient with nausea/vomiting, and abdominal pain concern for postop ileus and endometritis now passing flatus.     #Postoperative ileus with N/V  - NPO, LR@125   - f/u final abd Xray results   - now passing flatus  - encourage ambulation     #Presumed endometritis  - Febrile to 38 at 846p ()  - C/w Invanz (-)  - f/u BCx and UCx ()    #Acute blood loss anemia   - Elevated lactate, now resolved: 3.2->2.8->2.7->1.5->1.2  - Monitor Hct: 28.7->(4uPRBC)->24.5->24.2->21.6->21.1->1uPRBC->24.4->23.8    - Monitor Plt: 492->(1Plt)->309->320->277->265->250->250    - F/u AM CBC/CMP/Coags     #Fetal loss  - SW consult   - Demise paperwork completed     #Postoperative state  - IV Tylenol, Toradol, Gabapentin, Oxy and Dilaudid prn   - Incision dressing removed   - PT recs: home with home PT    #FEN/GI  - Reg diet  - D/c Tariq pending AM labs    #Ppx  - DVT: SCDs, early ambulation,  HSQ       Berta Patton PGY3  17-Oct-2024 01:00

## 2024-11-26 ENCOUNTER — APPOINTMENT (OUTPATIENT)
Dept: OBGYN | Facility: CLINIC | Age: 39
End: 2024-11-26

## 2025-01-21 NOTE — PROGRESS NOTE ADULT - ASSESSMENT
Continue present management   Patient is POD#4 from rLTCS and cerclage removal with di-di TIUP c/b uterine rupture, PPH QBL 1739, and PTD 32w. Vital signs are stable and physical exam is unremarkable.  - Increase ambulation  - Continue regular diet  - H/H: 12.6/39.1->11.6/33.9->8.9/26.5->9/27.2; stable   - Urinary retention resolved    Blanca Morgan, PGY-1

## 2025-02-26 NOTE — OB PROVIDER H&P - HISTORY OF PRESENT ILLNESS
Addended by: ESTEFANIA TORRES on: 2/26/2025 10:04 AM     Modules accepted: Orders     R2 H&P    Pt is a 37y/o  at 32w presenting to triage with painful ctx since 730am. Pt reports ctx have significantly worsened, prompting her presentation. -VB, -LOF, +FM.  Prenatal course: IUI pregnancy, di-di TIUP (initially tri-tri triplet gestation s/p fetal reduction). Pt underwent ultrasound-indicated cerclage placement at 17w3d (cervix noted to be 1.5cm at 16w).   ATU: A- vertex, anterior placenta, CHRISTIAN wnl. B- vtx, anterior placenta, polyhydramnios (MVP 8.8cm)  GBS unk    OBHx: - pre-viable classical C/S and myomectomy at 21w in setting of bleeding previa/PPROM, EBL 4L, under GETA, s/p SICU admission for hemodynamic monitoring, s/p 5u pRBC/2 FFP/1 Plt transfusion. Course also c/b endometritis s/p Invanz.  SAB x1, TOP x2 (D+C x2 in total)  GynHx: denies h/o abnormal paps, STI's, fibroids, cysts  PMHx: anemia, sciatica  PSHx: C/S x1 with myomectomy, D+C x2  Med: PNV  All: NKDA  SH: denies alcohol, tobacco, or drug use  Psych: denies h/o anxiety or depression

## 2025-03-18 ENCOUNTER — APPOINTMENT (OUTPATIENT)
Dept: OBGYN | Facility: CLINIC | Age: 40
End: 2025-03-18
Payer: COMMERCIAL

## 2025-03-18 VITALS — DIASTOLIC BLOOD PRESSURE: 81 MMHG | SYSTOLIC BLOOD PRESSURE: 121 MMHG

## 2025-03-18 DIAGNOSIS — Z30.09 ENCOUNTER FOR OTHER GENERAL COUNSELING AND ADVICE ON CONTRACEPTION: ICD-10-CM

## 2025-03-18 PROCEDURE — 99213 OFFICE O/P EST LOW 20 MIN: CPT

## 2025-03-18 RX ORDER — LACTIC ACID, L-, CITRIC ACID MONOHYDRATE, AND POTASSIUM BITARTRATE 90; 50; 20 MG/5G; MG/5G; MG/5G
1.8-1-0.4 GEL VAGINAL
Qty: 1 | Refills: 3 | Status: ACTIVE | COMMUNITY
Start: 2025-03-18 | End: 1900-01-01

## 2025-05-06 ENCOUNTER — APPOINTMENT (OUTPATIENT)
Dept: OBGYN | Facility: CLINIC | Age: 40
End: 2025-05-06
